# Patient Record
Sex: MALE | Race: WHITE | Employment: OTHER | URBAN - METROPOLITAN AREA
[De-identification: names, ages, dates, MRNs, and addresses within clinical notes are randomized per-mention and may not be internally consistent; named-entity substitution may affect disease eponyms.]

---

## 2018-03-21 ENCOUNTER — HOSPITAL ENCOUNTER (EMERGENCY)
Age: 48
Discharge: HOME | End: 2018-03-21
Payer: COMMERCIAL

## 2018-03-21 VITALS
HEIGHT: 64 IN | WEIGHT: 165 LBS | BODY MASS INDEX: 28.17 KG/M2 | DIASTOLIC BLOOD PRESSURE: 80 MMHG | OXYGEN SATURATION: 98 % | SYSTOLIC BLOOD PRESSURE: 131 MMHG | RESPIRATION RATE: 16 BRPM | HEART RATE: 72 BPM
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
## 2018-03-21 DIAGNOSIS — S39.012A STRAIN OF LUMBAR REGION, INITIAL ENCOUNTER: ICD-10-CM

## 2018-03-21 PROCEDURE — 99283 EMERGENCY DEPT VISIT LOW MDM: CPT

## 2018-03-21 RX ORDER — TRAMADOL HYDROCHLORIDE 50 MG/1
50 TABLET ORAL EVERY 6 HOURS PRN
Qty: 20 TABLET | Refills: 0 | Status: SHIPPED | OUTPATIENT
Start: 2018-03-21 | End: 2018-03-26

## 2018-03-21 RX ORDER — TIZANIDINE 4 MG/1
4 TABLET ORAL EVERY 6 HOURS PRN
Qty: 20 TABLET | Refills: 0 | Status: SHIPPED | OUTPATIENT
Start: 2018-03-21 | End: 2018-03-26

## 2018-03-21 ASSESSMENT — PAIN DESCRIPTION - LOCATION: LOCATION: LEG;BACK

## 2018-03-21 ASSESSMENT — PAIN DESCRIPTION - PAIN TYPE: TYPE: ACUTE PAIN

## 2018-03-21 ASSESSMENT — PAIN DESCRIPTION - ORIENTATION: ORIENTATION: RIGHT

## 2018-03-21 ASSESSMENT — PAIN SCALES - GENERAL: PAINLEVEL_OUTOF10: 7

## 2018-04-11 ENCOUNTER — APPOINTMENT (OUTPATIENT)
Dept: GENERAL RADIOLOGY | Age: 48
End: 2018-04-11
Payer: COMMERCIAL

## 2018-04-11 ENCOUNTER — APPOINTMENT (OUTPATIENT)
Dept: CT IMAGING | Age: 48
End: 2018-04-11
Payer: COMMERCIAL

## 2018-04-11 ENCOUNTER — HOSPITAL ENCOUNTER (EMERGENCY)
Age: 48
Discharge: HOME | End: 2018-04-11
Attending: EMERGENCY MEDICINE
Payer: COMMERCIAL

## 2018-04-11 VITALS
OXYGEN SATURATION: 99 % | RESPIRATION RATE: 20 BRPM | BODY MASS INDEX: 28.17 KG/M2 | DIASTOLIC BLOOD PRESSURE: 73 MMHG | HEART RATE: 81 BPM | WEIGHT: 165 LBS | TEMPERATURE: 98.3 F | SYSTOLIC BLOOD PRESSURE: 109 MMHG | HEIGHT: 64 IN

## 2018-04-11 DIAGNOSIS — N20.1 CALCULUS OF URETER: Primary | ICD-10-CM

## 2018-04-11 LAB
ALBUMIN SERPL-MCNC: 4.3 G/DL (ref 3.5–5.2)
ALP BLD-CCNC: 67 U/L (ref 40–129)
ALT SERPL-CCNC: 28 U/L (ref 0–40)
ANION GAP SERPL CALCULATED.3IONS-SCNC: 14 MMOL/L (ref 7–16)
AST SERPL-CCNC: 32 U/L (ref 0–39)
BACTERIA: NORMAL /HPF
BASOPHILS ABSOLUTE: 0 E9/L (ref 0–0.2)
BASOPHILS RELATIVE PERCENT: 0 % (ref 0–2)
BILIRUB SERPL-MCNC: 0.5 MG/DL (ref 0–1.2)
BILIRUBIN URINE: NEGATIVE
BLOOD, URINE: NEGATIVE
BUN BLDV-MCNC: 17 MG/DL (ref 6–20)
CALCIUM SERPL-MCNC: 9.4 MG/DL (ref 8.6–10.2)
CHLORIDE BLD-SCNC: 101 MMOL/L (ref 98–107)
CLARITY: CLEAR
CO2: 26 MMOL/L (ref 22–29)
COLOR: YELLOW
CREAT SERPL-MCNC: 1.6 MG/DL (ref 0.7–1.2)
CRYSTALS, UA: NORMAL
EOSINOPHILS ABSOLUTE: 0 E9/L (ref 0.05–0.5)
EOSINOPHILS RELATIVE PERCENT: 0 % (ref 0–6)
GFR AFRICAN AMERICAN: 56
GFR NON-AFRICAN AMERICAN: 46 ML/MIN/1.73
GLUCOSE BLD-MCNC: 142 MG/DL (ref 74–109)
GLUCOSE URINE: NEGATIVE MG/DL
HCT VFR BLD CALC: 41.9 % (ref 37–54)
HEMOGLOBIN: 14.1 G/DL (ref 12.5–16.5)
KETONES, URINE: ABNORMAL MG/DL
LACTIC ACID: 1.8 MMOL/L (ref 0.5–2.2)
LEUKOCYTE ESTERASE, URINE: NEGATIVE
LIPASE: 47 U/L (ref 13–60)
LYMPHOCYTES ABSOLUTE: 0.52 E9/L (ref 1.5–4)
LYMPHOCYTES RELATIVE PERCENT: 5.2 % (ref 20–42)
MCH RBC QN AUTO: 29.1 PG (ref 26–35)
MCHC RBC AUTO-ENTMCNC: 33.7 % (ref 32–34.5)
MCV RBC AUTO: 86.4 FL (ref 80–99.9)
MONOCYTES ABSOLUTE: 0.52 E9/L (ref 0.1–0.95)
MONOCYTES RELATIVE PERCENT: 5.2 % (ref 2–12)
NEUTROPHILS ABSOLUTE: 9.36 E9/L (ref 1.8–7.3)
NEUTROPHILS RELATIVE PERCENT: 89.6 % (ref 43–80)
NITRITE, URINE: NEGATIVE
NUCLEATED RED BLOOD CELLS: 0 /100 WBC
PDW BLD-RTO: 12.3 FL (ref 11.5–15)
PH UA: 5 (ref 5–9)
PLATELET # BLD: 268 E9/L (ref 130–450)
PMV BLD AUTO: 10.5 FL (ref 7–12)
POTASSIUM SERPL-SCNC: 4.3 MMOL/L (ref 3.5–5)
PROTEIN UA: ABNORMAL MG/DL
RBC # BLD: 4.85 E12/L (ref 3.8–5.8)
RBC UA: NORMAL /HPF (ref 0–2)
SODIUM BLD-SCNC: 141 MMOL/L (ref 132–146)
SPECIFIC GRAVITY UA: >=1.03 (ref 1–1.03)
TOTAL PROTEIN: 7.4 G/DL (ref 6.4–8.3)
UROBILINOGEN, URINE: 0.2 E.U./DL
WBC # BLD: 10.4 E9/L (ref 4.5–11.5)
WBC UA: NORMAL /HPF (ref 0–5)

## 2018-04-11 PROCEDURE — 2580000003 HC RX 258: Performed by: EMERGENCY MEDICINE

## 2018-04-11 PROCEDURE — 6360000002 HC RX W HCPCS: Performed by: EMERGENCY MEDICINE

## 2018-04-11 PROCEDURE — 81001 URINALYSIS AUTO W/SCOPE: CPT

## 2018-04-11 PROCEDURE — 74176 CT ABD & PELVIS W/O CONTRAST: CPT

## 2018-04-11 PROCEDURE — 71045 X-RAY EXAM CHEST 1 VIEW: CPT

## 2018-04-11 PROCEDURE — 99284 EMERGENCY DEPT VISIT MOD MDM: CPT

## 2018-04-11 PROCEDURE — 83605 ASSAY OF LACTIC ACID: CPT

## 2018-04-11 PROCEDURE — 83690 ASSAY OF LIPASE: CPT

## 2018-04-11 PROCEDURE — 96374 THER/PROPH/DIAG INJ IV PUSH: CPT

## 2018-04-11 PROCEDURE — 85025 COMPLETE CBC W/AUTO DIFF WBC: CPT

## 2018-04-11 PROCEDURE — 96375 TX/PRO/DX INJ NEW DRUG ADDON: CPT

## 2018-04-11 PROCEDURE — 80053 COMPREHEN METABOLIC PANEL: CPT

## 2018-04-11 PROCEDURE — 87088 URINE BACTERIA CULTURE: CPT

## 2018-04-11 RX ORDER — KETOROLAC TROMETHAMINE 30 MG/ML
15 INJECTION, SOLUTION INTRAMUSCULAR; INTRAVENOUS ONCE
Status: COMPLETED | OUTPATIENT
Start: 2018-04-11 | End: 2018-04-11

## 2018-04-11 RX ORDER — ONDANSETRON 2 MG/ML
4 INJECTION INTRAMUSCULAR; INTRAVENOUS ONCE
Status: COMPLETED | OUTPATIENT
Start: 2018-04-11 | End: 2018-04-11

## 2018-04-11 RX ORDER — SODIUM CHLORIDE 0.9 % (FLUSH) 0.9 %
10 SYRINGE (ML) INJECTION PRN
Status: DISCONTINUED | OUTPATIENT
Start: 2018-04-11 | End: 2018-04-11 | Stop reason: HOSPADM

## 2018-04-11 RX ORDER — KETOROLAC TROMETHAMINE 10 MG/1
10 TABLET, FILM COATED ORAL EVERY 6 HOURS PRN
Qty: 8 TABLET | Refills: 0 | Status: SHIPPED | OUTPATIENT
Start: 2018-04-11 | End: 2021-09-27

## 2018-04-11 RX ORDER — 0.9 % SODIUM CHLORIDE 0.9 %
1000 INTRAVENOUS SOLUTION INTRAVENOUS ONCE
Status: COMPLETED | OUTPATIENT
Start: 2018-04-11 | End: 2018-04-11

## 2018-04-11 RX ORDER — MORPHINE SULFATE 10 MG/ML
6 INJECTION, SOLUTION INTRAMUSCULAR; INTRAVENOUS ONCE
Status: COMPLETED | OUTPATIENT
Start: 2018-04-11 | End: 2018-04-11

## 2018-04-11 RX ADMIN — SODIUM CHLORIDE 1000 ML: 9 INJECTION, SOLUTION INTRAVENOUS at 09:54

## 2018-04-11 RX ADMIN — KETOROLAC TROMETHAMINE 15 MG: 30 INJECTION, SOLUTION INTRAMUSCULAR at 12:21

## 2018-04-11 RX ADMIN — MORPHINE SULFATE 6 MG: 10 INJECTION, SOLUTION INTRAMUSCULAR; INTRAVENOUS at 09:55

## 2018-04-11 RX ADMIN — ONDANSETRON 4 MG: 2 INJECTION INTRAMUSCULAR; INTRAVENOUS at 09:55

## 2018-04-11 ASSESSMENT — ENCOUNTER SYMPTOMS
DIARRHEA: 0
EYE DISCHARGE: 0
VOMITING: 0
WHEEZING: 0
EYE REDNESS: 0
BACK PAIN: 0
EYE PAIN: 0
SINUS PRESSURE: 0
ABDOMINAL PAIN: 1
ABDOMINAL DISTENTION: 1
SORE THROAT: 0
COUGH: 0
NAUSEA: 0
SHORTNESS OF BREATH: 0

## 2018-04-11 ASSESSMENT — PAIN DESCRIPTION - FREQUENCY: FREQUENCY: CONTINUOUS

## 2018-04-11 ASSESSMENT — PAIN SCALES - GENERAL
PAINLEVEL_OUTOF10: 10
PAINLEVEL_OUTOF10: 5
PAINLEVEL_OUTOF10: 7

## 2018-04-11 ASSESSMENT — PAIN DESCRIPTION - DESCRIPTORS: DESCRIPTORS: ACHING;SHARP

## 2018-04-11 ASSESSMENT — PAIN DESCRIPTION - PAIN TYPE: TYPE: ACUTE PAIN

## 2018-04-11 ASSESSMENT — PAIN DESCRIPTION - LOCATION: LOCATION: ABDOMEN

## 2018-04-11 ASSESSMENT — PAIN DESCRIPTION - ORIENTATION: ORIENTATION: LEFT

## 2018-04-13 LAB — URINE CULTURE, ROUTINE: NORMAL

## 2019-07-16 ENCOUNTER — APPOINTMENT (OUTPATIENT)
Dept: CT IMAGING | Age: 49
End: 2019-07-16
Payer: OTHER MISCELLANEOUS

## 2019-07-16 ENCOUNTER — HOSPITAL ENCOUNTER (EMERGENCY)
Age: 49
Discharge: HOME | End: 2019-07-16
Payer: OTHER MISCELLANEOUS

## 2019-07-16 VITALS
TEMPERATURE: 98.3 F | HEIGHT: 64 IN | RESPIRATION RATE: 14 BRPM | BODY MASS INDEX: 28.17 KG/M2 | HEART RATE: 78 BPM | OXYGEN SATURATION: 97 % | SYSTOLIC BLOOD PRESSURE: 129 MMHG | WEIGHT: 165 LBS | DIASTOLIC BLOOD PRESSURE: 72 MMHG

## 2019-07-16 DIAGNOSIS — S01.512A LACERATION OF ORAL CAVITY, INITIAL ENCOUNTER: ICD-10-CM
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
## 2019-07-16 DIAGNOSIS — S09.90XA INJURY OF HEAD, INITIAL ENCOUNTER: ICD-10-CM

## 2019-07-16 PROCEDURE — 70450 CT HEAD/BRAIN W/O DYE: CPT

## 2019-07-16 PROCEDURE — 6370000000 HC RX 637 (ALT 250 FOR IP): Performed by: NURSE PRACTITIONER

## 2019-07-16 PROCEDURE — 99283 EMERGENCY DEPT VISIT LOW MDM: CPT

## 2019-07-16 RX ORDER — IBUPROFEN 800 MG/1
800 TABLET ORAL ONCE
Status: COMPLETED | OUTPATIENT
Start: 2019-07-16 | End: 2019-07-16

## 2019-07-16 RX ORDER — IBUPROFEN 800 MG/1
800 TABLET ORAL EVERY 6 HOURS PRN
Qty: 21 TABLET | Refills: 0 | Status: SHIPPED | OUTPATIENT
Start: 2019-07-16 | End: 2019-10-10 | Stop reason: ALTCHOICE

## 2019-07-16 RX ADMIN — IBUPROFEN 800 MG: 800 TABLET, FILM COATED ORAL at 19:27

## 2019-07-16 ASSESSMENT — PAIN DESCRIPTION - DESCRIPTORS: DESCRIPTORS: HEADACHE

## 2019-07-16 ASSESSMENT — PAIN DESCRIPTION - ONSET: ONSET: SUDDEN

## 2019-07-16 ASSESSMENT — PAIN DESCRIPTION - LOCATION: LOCATION: HEAD

## 2019-07-16 ASSESSMENT — PAIN DESCRIPTION - FREQUENCY: FREQUENCY: CONTINUOUS

## 2019-07-16 ASSESSMENT — PAIN DESCRIPTION - PAIN TYPE: TYPE: ACUTE PAIN

## 2019-07-16 ASSESSMENT — PAIN DESCRIPTION - PROGRESSION: CLINICAL_PROGRESSION: GRADUALLY WORSENING

## 2019-07-16 ASSESSMENT — PAIN DESCRIPTION - ORIENTATION: ORIENTATION: LEFT

## 2019-07-16 ASSESSMENT — PAIN SCALES - GENERAL: PAINLEVEL_OUTOF10: 7

## 2019-07-16 NOTE — ED NOTES
Reviewed discharge instructions with patient, discussed medications and addressed all patient questions/concerns. Pt verbalizes understanding.        Jhonatan Norris RN  07/16/19 2118
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
## 2019-10-10 ENCOUNTER — APPOINTMENT (OUTPATIENT)
Dept: ULTRASOUND IMAGING | Age: 49
End: 2019-10-10
Payer: COMMERCIAL

## 2019-10-10 ENCOUNTER — HOSPITAL ENCOUNTER (EMERGENCY)
Age: 49
Discharge: HOME | End: 2019-10-10
Payer: COMMERCIAL

## 2019-10-10 VITALS
HEART RATE: 75 BPM | SYSTOLIC BLOOD PRESSURE: 126 MMHG | RESPIRATION RATE: 16 BRPM | HEIGHT: 64 IN | BODY MASS INDEX: 28.17 KG/M2 | OXYGEN SATURATION: 97 % | DIASTOLIC BLOOD PRESSURE: 66 MMHG | WEIGHT: 165 LBS | TEMPERATURE: 98.2 F

## 2019-10-10 DIAGNOSIS — M71.22 BAKER'S CYST OF KNEE, LEFT: Primary | ICD-10-CM

## 2019-10-10 PROCEDURE — 93971 EXTREMITY STUDY: CPT

## 2019-10-10 PROCEDURE — 99283 EMERGENCY DEPT VISIT LOW MDM: CPT

## 2019-10-10 RX ORDER — NAPROXEN 500 MG/1
500 TABLET ORAL 2 TIMES DAILY PRN
Qty: 28 TABLET | Refills: 0 | Status: SHIPPED | OUTPATIENT
Start: 2019-10-10 | End: 2021-09-27

## 2019-10-10 ASSESSMENT — PAIN DESCRIPTION - ORIENTATION: ORIENTATION: LEFT

## 2019-10-10 ASSESSMENT — PAIN SCALES - GENERAL: PAINLEVEL_OUTOF10: 10

## 2019-10-10 ASSESSMENT — PAIN DESCRIPTION - PAIN TYPE: TYPE: ACUTE PAIN

## 2019-10-10 ASSESSMENT — PAIN DESCRIPTION - LOCATION: LOCATION: KNEE

## 2021-09-27 ENCOUNTER — HOSPITAL ENCOUNTER (INPATIENT)
Age: 51
LOS: 7 days | Discharge: HOME | DRG: 177 | End: 2021-10-04
Attending: EMERGENCY MEDICINE | Admitting: INTERNAL MEDICINE
Payer: COMMERCIAL

## 2021-09-27 ENCOUNTER — APPOINTMENT (OUTPATIENT)
Dept: GENERAL RADIOLOGY | Age: 51
DRG: 177 | End: 2021-09-27
Payer: COMMERCIAL

## 2021-09-27 DIAGNOSIS — J96.00 ACUTE RESPIRATORY FAILURE DUE TO COVID-19 (HCC): ICD-10-CM

## 2021-09-27 DIAGNOSIS — U07.1 ACUTE RESPIRATORY FAILURE DUE TO COVID-19 (HCC): ICD-10-CM

## 2021-09-27 DIAGNOSIS — U07.1 COVID-19: Primary | ICD-10-CM

## 2021-09-27 PROBLEM — J96.01 ACUTE RESPIRATORY FAILURE WITH HYPOXIA (HCC): Status: ACTIVE | Noted: 2021-09-27

## 2021-09-27 LAB
ALBUMIN SERPL-MCNC: 3.4 G/DL (ref 3.5–5.2)
ALP BLD-CCNC: 129 U/L (ref 40–129)
ALT SERPL-CCNC: 164 U/L (ref 0–40)
ANION GAP SERPL CALCULATED.3IONS-SCNC: 13 MMOL/L (ref 7–16)
AST SERPL-CCNC: 51 U/L (ref 0–39)
B.E.: 2.3 MMOL/L (ref -3–3)
BASOPHILS ABSOLUTE: 0.04 E9/L (ref 0–0.2)
BASOPHILS RELATIVE PERCENT: 0.3 % (ref 0–2)
BILIRUB SERPL-MCNC: 0.3 MG/DL (ref 0–1.2)
BUN BLDV-MCNC: 20 MG/DL (ref 6–20)
C-REACTIVE PROTEIN: 17.7 MG/DL (ref 0–0.4)
CALCIUM SERPL-MCNC: 9.3 MG/DL (ref 8.6–10.2)
CHLORIDE BLD-SCNC: 101 MMOL/L (ref 98–107)
CO2: 26 MMOL/L (ref 22–29)
COHB: 0.3 % (ref 0–1.5)
CREAT SERPL-MCNC: 0.9 MG/DL (ref 0.7–1.2)
CRITICAL: ABNORMAL
D DIMER: 1940 NG/ML DDU
DATE ANALYZED: ABNORMAL
DATE OF COLLECTION: ABNORMAL
EKG ATRIAL RATE: 94 BPM
EKG P AXIS: 21 DEGREES
EKG P-R INTERVAL: 132 MS
EKG Q-T INTERVAL: 338 MS
EKG QRS DURATION: 74 MS
EKG QTC CALCULATION (BAZETT): 422 MS
EKG R AXIS: 15 DEGREES
EKG T AXIS: 20 DEGREES
EKG VENTRICULAR RATE: 94 BPM
EOSINOPHILS ABSOLUTE: 0.07 E9/L (ref 0.05–0.5)
EOSINOPHILS RELATIVE PERCENT: 0.6 % (ref 0–6)
FERRITIN: 2693 NG/ML
GFR AFRICAN AMERICAN: >60
GFR NON-AFRICAN AMERICAN: >60 ML/MIN/1.73
GLUCOSE BLD-MCNC: 137 MG/DL (ref 74–99)
HBA1C MFR BLD: 6 % (ref 4–5.6)
HCO3: 25.5 MMOL/L (ref 22–26)
HCT VFR BLD CALC: 41.3 % (ref 37–54)
HEMOGLOBIN: 13.3 G/DL (ref 12.5–16.5)
HHB: 4.5 % (ref 0–5)
IMMATURE GRANULOCYTES #: 0.22 E9/L
IMMATURE GRANULOCYTES %: 1.9 % (ref 0–5)
LAB: ABNORMAL
LYMPHOCYTES ABSOLUTE: 0.75 E9/L (ref 1.5–4)
LYMPHOCYTES RELATIVE PERCENT: 6.5 % (ref 20–42)
Lab: ABNORMAL
MCH RBC QN AUTO: 29.3 PG (ref 26–35)
MCHC RBC AUTO-ENTMCNC: 32.2 % (ref 32–34.5)
MCV RBC AUTO: 91 FL (ref 80–99.9)
METHB: 0.3 % (ref 0–1.5)
MODE: ABNORMAL
MONOCYTES ABSOLUTE: 1.2 E9/L (ref 0.1–0.95)
MONOCYTES RELATIVE PERCENT: 10.4 % (ref 2–12)
NEUTROPHILS ABSOLUTE: 9.23 E9/L (ref 1.8–7.3)
NEUTROPHILS RELATIVE PERCENT: 80.3 % (ref 43–80)
O2 CONTENT: 17.3 ML/DL
O2 SATURATION: 95.5 % (ref 92–98.5)
O2HB: 94.9 % (ref 94–97)
OPERATOR ID: 8213
PATIENT TEMP: 37 C
PCO2: 34.8 MMHG (ref 35–45)
PDW BLD-RTO: 13 FL (ref 11.5–15)
PH BLOOD GAS: 7.48 (ref 7.35–7.45)
PLATELET # BLD: 536 E9/L (ref 130–450)
PMV BLD AUTO: 10.3 FL (ref 7–12)
PO2: 74.7 MMHG (ref 75–100)
POTASSIUM REFLEX MAGNESIUM: 3.8 MMOL/L (ref 3.5–5)
PROCALCITONIN: 0.18 NG/ML (ref 0–0.08)
RBC # BLD: 4.54 E12/L (ref 3.8–5.8)
SODIUM BLD-SCNC: 140 MMOL/L (ref 132–146)
SOURCE, BLOOD GAS: ABNORMAL
THB: 12.9 G/DL (ref 11.5–16.5)
TIME ANALYZED: 1543
TOTAL PROTEIN: 7.7 G/DL (ref 6.4–8.3)
WBC # BLD: 11.5 E9/L (ref 4.5–11.5)

## 2021-09-27 PROCEDURE — 99285 EMERGENCY DEPT VISIT HI MDM: CPT

## 2021-09-27 PROCEDURE — 2060000000 HC ICU INTERMEDIATE R&B

## 2021-09-27 PROCEDURE — 93005 ELECTROCARDIOGRAM TRACING: CPT | Performed by: FAMILY MEDICINE

## 2021-09-27 PROCEDURE — 6370000000 HC RX 637 (ALT 250 FOR IP): Performed by: NURSE PRACTITIONER

## 2021-09-27 PROCEDURE — 99223 1ST HOSP IP/OBS HIGH 75: CPT | Performed by: INTERNAL MEDICINE

## 2021-09-27 PROCEDURE — 2580000003 HC RX 258: Performed by: FAMILY MEDICINE

## 2021-09-27 PROCEDURE — 86140 C-REACTIVE PROTEIN: CPT

## 2021-09-27 PROCEDURE — 82805 BLOOD GASES W/O2 SATURATION: CPT

## 2021-09-27 PROCEDURE — 71045 X-RAY EXAM CHEST 1 VIEW: CPT

## 2021-09-27 PROCEDURE — 6360000002 HC RX W HCPCS: Performed by: NURSE PRACTITIONER

## 2021-09-27 PROCEDURE — XW0DXM6 INTRODUCTION OF BARICITINIB INTO MOUTH AND PHARYNX, EXTERNAL APPROACH, NEW TECHNOLOGY GROUP 6: ICD-10-PCS | Performed by: INTERNAL MEDICINE

## 2021-09-27 PROCEDURE — 82728 ASSAY OF FERRITIN: CPT

## 2021-09-27 PROCEDURE — 87040 BLOOD CULTURE FOR BACTERIA: CPT

## 2021-09-27 PROCEDURE — 80053 COMPREHEN METABOLIC PANEL: CPT

## 2021-09-27 PROCEDURE — 2700000000 HC OXYGEN THERAPY PER DAY

## 2021-09-27 PROCEDURE — 84145 PROCALCITONIN (PCT): CPT

## 2021-09-27 PROCEDURE — 85025 COMPLETE CBC W/AUTO DIFF WBC: CPT

## 2021-09-27 PROCEDURE — 85378 FIBRIN DEGRADE SEMIQUANT: CPT

## 2021-09-27 PROCEDURE — 83036 HEMOGLOBIN GLYCOSYLATED A1C: CPT

## 2021-09-27 PROCEDURE — 2580000003 HC RX 258: Performed by: NURSE PRACTITIONER

## 2021-09-27 PROCEDURE — 93010 ELECTROCARDIOGRAM REPORT: CPT | Performed by: INTERNAL MEDICINE

## 2021-09-27 RX ORDER — DEXAMETHASONE SODIUM PHOSPHATE 4 MG/ML
6 INJECTION, SOLUTION INTRA-ARTICULAR; INTRALESIONAL; INTRAMUSCULAR; INTRAVENOUS; SOFT TISSUE EVERY 24 HOURS
Status: DISCONTINUED | OUTPATIENT
Start: 2021-09-27 | End: 2021-09-28

## 2021-09-27 RX ORDER — DEXAMETHASONE SODIUM PHOSPHATE 10 MG/ML
6 INJECTION INTRAMUSCULAR; INTRAVENOUS EVERY 24 HOURS
Status: DISCONTINUED | OUTPATIENT
Start: 2021-09-27 | End: 2021-09-27 | Stop reason: SDUPTHER

## 2021-09-27 RX ORDER — POLYETHYLENE GLYCOL 3350 17 G/17G
17 POWDER, FOR SOLUTION ORAL DAILY PRN
Status: DISCONTINUED | OUTPATIENT
Start: 2021-09-27 | End: 2021-10-05 | Stop reason: HOSPADM

## 2021-09-27 RX ORDER — SODIUM CHLORIDE 0.9 % (FLUSH) 0.9 %
5-40 SYRINGE (ML) INJECTION EVERY 12 HOURS SCHEDULED
Status: DISCONTINUED | OUTPATIENT
Start: 2021-09-27 | End: 2021-10-05 | Stop reason: HOSPADM

## 2021-09-27 RX ORDER — ONDANSETRON 4 MG/1
4 TABLET, ORALLY DISINTEGRATING ORAL EVERY 8 HOURS PRN
Status: DISCONTINUED | OUTPATIENT
Start: 2021-09-27 | End: 2021-10-05 | Stop reason: HOSPADM

## 2021-09-27 RX ORDER — ONDANSETRON 2 MG/ML
4 INJECTION INTRAMUSCULAR; INTRAVENOUS EVERY 6 HOURS PRN
Status: DISCONTINUED | OUTPATIENT
Start: 2021-09-27 | End: 2021-10-05 | Stop reason: HOSPADM

## 2021-09-27 RX ORDER — 0.9 % SODIUM CHLORIDE 0.9 %
1000 INTRAVENOUS SOLUTION INTRAVENOUS ONCE
Status: COMPLETED | OUTPATIENT
Start: 2021-09-27 | End: 2021-09-27

## 2021-09-27 RX ORDER — ZINC SULFATE 50(220)MG
50 CAPSULE ORAL DAILY
Status: DISCONTINUED | OUTPATIENT
Start: 2021-09-27 | End: 2021-10-05 | Stop reason: HOSPADM

## 2021-09-27 RX ORDER — SODIUM CHLORIDE 0.9 % (FLUSH) 0.9 %
5-40 SYRINGE (ML) INJECTION PRN
Status: DISCONTINUED | OUTPATIENT
Start: 2021-09-27 | End: 2021-10-05 | Stop reason: HOSPADM

## 2021-09-27 RX ORDER — SODIUM CHLORIDE 9 MG/ML
25 INJECTION, SOLUTION INTRAVENOUS PRN
Status: DISCONTINUED | OUTPATIENT
Start: 2021-09-27 | End: 2021-10-05 | Stop reason: HOSPADM

## 2021-09-27 RX ORDER — VITAMIN B COMPLEX
2000 TABLET ORAL DAILY
Status: DISCONTINUED | OUTPATIENT
Start: 2021-09-27 | End: 2021-10-05 | Stop reason: HOSPADM

## 2021-09-27 RX ORDER — ACETAMINOPHEN 325 MG/1
650 TABLET ORAL EVERY 6 HOURS PRN
Status: DISCONTINUED | OUTPATIENT
Start: 2021-09-27 | End: 2021-10-05 | Stop reason: HOSPADM

## 2021-09-27 RX ORDER — ASCORBIC ACID 500 MG
500 TABLET ORAL 2 TIMES DAILY
Status: DISCONTINUED | OUTPATIENT
Start: 2021-09-27 | End: 2021-10-05 | Stop reason: HOSPADM

## 2021-09-27 RX ORDER — ACETAMINOPHEN 650 MG/1
650 SUPPOSITORY RECTAL EVERY 6 HOURS PRN
Status: DISCONTINUED | OUTPATIENT
Start: 2021-09-27 | End: 2021-10-05 | Stop reason: HOSPADM

## 2021-09-27 RX ADMIN — ZINC SULFATE 220 MG (50 MG) CAPSULE 50 MG: CAPSULE at 16:28

## 2021-09-27 RX ADMIN — ENOXAPARIN SODIUM 30 MG: 30 INJECTION SUBCUTANEOUS at 19:46

## 2021-09-27 RX ADMIN — DEXAMETHASONE SODIUM PHOSPHATE 6 MG: 4 INJECTION, SOLUTION INTRAMUSCULAR; INTRAVENOUS at 16:38

## 2021-09-27 RX ADMIN — Medication 500 MG: at 19:46

## 2021-09-27 RX ADMIN — Medication 2000 UNITS: at 16:37

## 2021-09-27 RX ADMIN — BARICITINIB 4 MG: 2 TABLET, FILM COATED ORAL at 17:12

## 2021-09-27 RX ADMIN — Medication 10 ML: at 19:46

## 2021-09-27 RX ADMIN — SODIUM CHLORIDE 1000 ML: 9 INJECTION, SOLUTION INTRAVENOUS at 11:51

## 2021-09-27 ASSESSMENT — ENCOUNTER SYMPTOMS
VOMITING: 0
COUGH: 1
SORE THROAT: 0
DIARRHEA: 0
ABDOMINAL PAIN: 0
NAUSEA: 0
TROUBLE SWALLOWING: 0
CONSTIPATION: 0

## 2021-09-27 ASSESSMENT — PAIN SCALES - GENERAL: PAINLEVEL_OUTOF10: 0

## 2021-09-27 NOTE — ACP (ADVANCE CARE PLANNING)
Provider review and signature  [] POLST/POST/MOLST/MOST prepared for Provider review and signature      Follow-up plan:    [] Schedule follow-up conversation to continue planning  [x] Referred individual to Provider for additional questions/concerns   [] Advised patient/agent/surrogate to review completed ACP document and update if needed with changes in condition, patient preferences or care setting    [] This note routed to one or more involved healthcare providers

## 2021-09-27 NOTE — ED NOTES
Spoke to felix at ext [phone], confirmed sbar pt prepared for transport via cart and monitor and 15L NRB     Ronna Valencia, FERNANDO  09/27/21 2359

## 2021-09-27 NOTE — ED PROVIDER NOTES
rhinorrhea. Eyes:      General: No scleral icterus. Conjunctiva/sclera: Conjunctivae normal.   Cardiovascular:      Rate and Rhythm: Normal rate and regular rhythm. Pulses: Normal pulses. Heart sounds: Normal heart sounds. No murmur heard. Pulmonary:      Breath sounds: Normal breath sounds. No wheezing, rhonchi or rales. Abdominal:      General: Abdomen is flat. Bowel sounds are normal.   Musculoskeletal:      Right lower leg: No edema. Left lower leg: No edema. Skin:     General: Skin is warm. Findings: No rash. Neurological:      General: No focal deficit present. Mental Status: He is alert. Psychiatric:         Mood and Affect: Mood normal.         Behavior: Behavior normal.     45year old male was Covid +10 days ago and recently treated with Medrol and Z-Alberto who presented to ER after seen at urgent care this morning and was found to have low O2 sats around 81. Patient was also treated with Z-Alberto and Medrol Dosepak as an outpatient. She denies any chest pain, shortness of breath, difficulty breathing, fever, chills at this time. His O2 sats in the ER are around 88. He is unvaccinated. He was satting low in the ED around  88 hence he was put on 6 L nasal cannula and with increased work of breathing he was switched over to nonrebreather mask. We spoke to Dr. Gee will admit the patient to medicine service. Dr. Gee would like pulmonology consulted for this patient.         --------------------------------------------- PAST HISTORY ---------------------------------------------  Past Medical History:  has a past medical history of Kidney stone. Past Surgical History:  has no past surgical history on file. Social History:  reports that he has never smoked. He has never used smokeless tobacco. He reports that he does not drink alcohol and does not use drugs. Family History: family history is not on file.      The patients home medications have been reviewed. Allergies: Patient has no known allergies.     -------------------------------------------------- RESULTS -------------------------------------------------    LABS:  Results for orders placed or performed during the hospital encounter of 09/27/21   C-reactive protein   Result Value Ref Range    CRP 17.7 (H) 0.0 - 0.4 mg/dL   FERRITIN   Result Value Ref Range    Ferritin 2,693 ng/mL   CBC WITH AUTO DIFFERENTIAL   Result Value Ref Range    WBC 11.5 4.5 - 11.5 E9/L    RBC 4.54 3.80 - 5.80 E12/L    Hemoglobin 13.3 12.5 - 16.5 g/dL    Hematocrit 41.3 37.0 - 54.0 %    MCV 91.0 80.0 - 99.9 fL    MCH 29.3 26.0 - 35.0 pg    MCHC 32.2 32.0 - 34.5 %    RDW 13.0 11.5 - 15.0 fL    Platelets 896 (H) 535 - 450 E9/L    MPV 10.3 7.0 - 12.0 fL    Neutrophils % 80.3 (H) 43.0 - 80.0 %    Immature Granulocytes % 1.9 0.0 - 5.0 %    Lymphocytes % 6.5 (L) 20.0 - 42.0 %    Monocytes % 10.4 2.0 - 12.0 %    Eosinophils % 0.6 0.0 - 6.0 %    Basophils % 0.3 0.0 - 2.0 %    Neutrophils Absolute 9.23 (H) 1.80 - 7.30 E9/L    Immature Granulocytes # 0.22 E9/L    Lymphocytes Absolute 0.75 (L) 1.50 - 4.00 E9/L    Monocytes Absolute 1.20 (H) 0.10 - 0.95 E9/L    Eosinophils Absolute 0.07 0.05 - 0.50 E9/L    Basophils Absolute 0.04 0.00 - 0.20 E9/L   Comprehensive Metabolic Panel w/ Reflex to MG   Result Value Ref Range    Sodium 140 132 - 146 mmol/L    Potassium reflex Magnesium 3.8 3.5 - 5.0 mmol/L    Chloride 101 98 - 107 mmol/L    CO2 26 22 - 29 mmol/L    Anion Gap 13 7 - 16 mmol/L    Glucose 137 (H) 74 - 99 mg/dL    BUN 20 6 - 20 mg/dL    CREATININE 0.9 0.7 - 1.2 mg/dL    GFR Non-African American >60 >=60 mL/min/1.73    GFR African American >60     Calcium 9.3 8.6 - 10.2 mg/dL    Total Protein 7.7 6.4 - 8.3 g/dL    Albumin 3.4 (L) 3.5 - 5.2 g/dL    Total Bilirubin 0.3 0.0 - 1.2 mg/dL    Alkaline Phosphatase 129 40 - 129 U/L     (H) 0 - 40 U/L    AST 51 (H) 0 - 39 U/L   EKG 12 Lead   Result Value Ref Range    Ventricular Rate 94 BPM    Atrial Rate 94 BPM    P-R Interval 132 ms    QRS Duration 74 ms    Q-T Interval 338 ms    QTc Calculation (Bazett) 422 ms    P Axis 21 degrees    R Axis 15 degrees    T Axis 20 degrees       RADIOLOGY:  XR CHEST PORTABLE   Final Result   1. Multifocal bilateral pneumonia             EKG:  This EKG is signed and interpreted by me. Rate: 94  Rhythm: Sinus  Interpretation: no acute changes  Comparison: was normal and no previous EKG available      ------------------------- NURSING NOTES AND VITALS REVIEWED ---------------------------  Date / Time Roomed:  9/27/2021 10:53 AM  ED Bed Assignment:  23/23    The nursing notes within the ED encounter and vital signs as below have been reviewed. Patient Vitals for the past 24 hrs:   BP Temp Temp src Pulse Resp SpO2 Height Weight   09/27/21 1526 (!) 140/87 -- -- 80 16 96 % -- --   09/27/21 1427 -- -- -- 83 16 95 % -- --   09/27/21 1354 136/85 98 °F (36.7 °C) Oral 90 16 94 % -- --   09/27/21 1326 -- -- -- 81 16 94 % -- --   09/27/21 1235 -- -- -- 71 16 99 % -- --   09/27/21 1200 -- -- -- 87 16 97 % -- --   09/27/21 1116 (!) 140/85 -- -- -- -- -- -- --   09/27/21 1112 -- 98.1 °F (36.7 °C) Oral 99 18 (!) 88 % 5' 4.5\" (1.638 m) 165 lb (74.8 kg)       Oxygen Saturation Interpretation: Normal    ------------------------------------------ PROGRESS NOTES ------------------------------------------  Re-evaluation(s):  Time: 1400  Patients symptoms show no change  Repeat physical examination is not changed    Counseling:  I have spoken with the patient and discussed todays results, in addition to providing specific details for the plan of care and counseling regarding the diagnosis and prognosis. Their questions are answered at this time and they are agreeable with the plan of admission.    --------------------------------- ADDITIONAL PROVIDER NOTES ---------------------------------  Consultations:  Time: 1530. Spoke with Dr. Gee. Discussed case.   They will admit the patient. This patient's ED course included: a personal history and physicial examination, re-evaluation prior to disposition, multiple bedside re-evaluations and complex medical decision making and emergency management    This patient has remained hemodynamically stable during their ED course. Diagnosis:  1. COVID-19    2. Acute respiratory failure due to COVID-19 [facility])        Disposition:  Patient's disposition: Admit to telemetry  Patient's condition is stable.          Bushra Hendrix MD  Resident  09/27/21 2361

## 2021-09-27 NOTE — H&P
[facility] History and Physical      CHIEF COMPLAINT:  + COVID 23, sent in from urgent care for hypoxia     History of Present Illness:     Patient is a 44 yo male patient who follows with PCP Dr. Joe Mendez with past medical history kidney stones. Patient presented to the ER sent in from urgent care with hypoxia-. Known COVID +. Reporting today is day 10 of symptoms so he went to get a COVID test so that he could return to work. He was told that his oxygen was low and that he needed to go to ER for evaluation. He works as a . Reporting he has been feeling ok. At night his symptoms are the worst-- reporting coughing more at night and exertional sob. Productive clear sputum. Sapphire Lanza He completed a zpak and steroid taper. He has been taking excedrin prn. He does report loss of taste/ smell- but returning. Symptoms moderate ongoing and progressive. He did not get his vaccine. He thinks that his children may have had COVID, but they are fine now. Patient awake and alert resting in ER in no acute distress. Reporting to get up to restroom he became somwhat sob. Denies fevers, chills, chest pain, nausea, vomiting, dysuria, hematuria, hematochezia. Informant(s) for H&P:patient, chart review       REVIEW OF SYSTEMS:  A comprehensive review of systems was negative except for: what is in the HPI        PMH:  Past Medical History:   Diagnosis Date    Kidney stone        Surgical History:  History reviewed. No pertinent surgical history. Medications Prior to Admission:    Prior to Admission medications    Medication Sig Start Date End Date Taking? Authorizing Provider   naproxen (NAPROSYN) 500 MG tablet Take 1 tablet by mouth 2 times daily as needed for Pain 10/10/19   Merline Salm, APRN - CNP   ketorolac (TORADOL) 10 MG tablet Take 1 tablet by mouth every 6 hours as needed for Pain 4/11/18 4/13/18  Jose Carlos Victor DO       Allergies:    Patient has no known allergies.     Social History:    reports that he has never smoked. He has never used smokeless tobacco. He reports that he does not drink alcohol and does not use drugs. Denies tobacco, illicits  Occasional etoh    Family History:   family history is not on file. No reportable family history     PHYSICAL EXAM:  Vitals:  BP (!) 140/87   Pulse 80   Temp 98 °F (36.7 °C) (Oral)   Resp 16   Ht 5' 4.5\" (1.638 m)   Wt 165 lb (74.8 kg)   SpO2 96%   BMI 27.88 kg/m²     General Appearance: alert and oriented to person, place and time and in no acute distress  Skin: warm and dry  Head: normocephalic and atraumatic  Neck: neck supple and non tender without mass   Pulmonary/Chest: clear to auscultation bilaterally  Cardiovascular: normal rate, normal S1 and S2 and no carotid bruits  Abdomen: soft, non-tender, non-distended, normal bowel sounds,  Extremities: no cyanosis, no clubbing and no edema  Neurologic: speech normal         LABS:  Recent Labs     09/27/21  1134      K 3.8      CO2 26   BUN 20   CREATININE 0.9   GLUCOSE 137*   CALCIUM 9.3       Recent Labs     09/27/21  1134   WBC 11.5   RBC 4.54   HGB 13.3   HCT 41.3   MCV 91.0   MCH 29.3   MCHC 32.2   RDW 13.0   *   MPV 10.3       No results for input(s): POCGLU in the last 72 hours. Radiology:   XR CHEST PORTABLE   Final Result   1. Multifocal bilateral pneumonia               ASSESSMENT:      Active Problems:    Acute respiratory failure with hypoxia (HCC)  Resolved Problems:    * No resolved hospital problems. *      PLAN:    1. Acute respiratory failure with hypoxia related to COVID 19 in unvaccinated pt: Pt sent in from urgent care for hypoxia. Known COVID positive. Today is day 10 of symptoms and he went to get a repeat test to see if he could return to work. Reporting coughing at night- clear sputum and exertional sob. Reporting sob in ER walking to restroom. He was 88% on RA on arrival to ER. Currently 91% ON 15 l hfnc. Consult pulmonology.     2. COVID 19 Viral Pneumonia: CXR showing multifocal bilateral pneumonia: productive of clear sputum. Will check procal. Recently completed steroid taper/ z-pack. Follow inflammatory\ markers. CRP 17.7 Consult pharmacy for baricitinib. Vitamin supplementation. Incentive spirometer. Prone as tolerated. Encouraged to get oob to chair. Lovenox per protocal. Check d dimer. 3. Elevated glucose: check hga1c. Pt has been on steroids outpt. Insulin ss. 4. Elevated LFTS: monitor         Code Status: FULL  DVT prophylaxis: lovenox      NOTE: This report was transcribed using voice recognition software. Every effort was made to ensure accuracy; however, inadvertent computerized transcription errors may be present. Electronically signed by CODY Zimmerman on 9/27/2021 at 3:36 PM     Addendum: I have personally participated in the history, exam, medical decision making with Kaitlyn Alfaro on the date of service and I agree with all of the pertinent clinical information unless otherwise noted. I have also reviewed and agree with the past medical, family, and social history unless otherwise noted. Patient was admitted with positive covid from urgent care    PHYSICAL EXAM:  Vitals:  /82   Pulse 93   Temp 98 °F (36.7 °C) (Oral)   Resp 16   Ht 5' 4.5\" (1.638 m)   Wt 165 lb (74.8 kg)   SpO2 95%   BMI 27.88 kg/m²   Gen: awake, alert, NAD  Lungs: clear to auscultation bilaterally no crackles no wheezing. Heart: RRR, no murmur   Abdomen: soft nontender nondistended positive bowel sounds. Extremities: full range of motion no peripheral edema. Impression:  Active Problems:    Acute respiratory failure with hypoxia (HCC)  Resolved Problems:    * No resolved hospital problems. *      My findings/plan include:    Patient is on acute hypoxic respiratory failure 2/2 covid 19 pneumonia. He will be treated with decadron, baricitinib, and vitamins. Will trend inflammatory markers.  Will follow up with procalcitonin. Pulmonology will be consulted. NOTE: This report was transcribed using voice recognition software. Every effort was made to ensure accuracy; however, inadvertent computerized transcription errors may be present.     Electronically signed by Raghu Hampton DO on 9/27/2021 at 5:36 PM

## 2021-09-27 NOTE — Clinical Note
Patient Class: Inpatient [101]   REQUIRED: Diagnosis: Acute respiratory failure with hypoxia [facility] [[id number]]   Estimated Length of Stay: Estimated stay of more than 2 midnights

## 2021-09-28 LAB
ADENOVIRUS BY PCR: NOT DETECTED
ALBUMIN SERPL-MCNC: 2.9 G/DL (ref 3.5–5.2)
ALP BLD-CCNC: 119 U/L (ref 40–129)
ALT SERPL-CCNC: 136 U/L (ref 0–40)
ANION GAP SERPL CALCULATED.3IONS-SCNC: 9 MMOL/L (ref 7–16)
AST SERPL-CCNC: 55 U/L (ref 0–39)
BASOPHILS ABSOLUTE: 0.01 E9/L (ref 0–0.2)
BASOPHILS RELATIVE PERCENT: 0.2 % (ref 0–2)
BILIRUB SERPL-MCNC: 0.3 MG/DL (ref 0–1.2)
BORDETELLA PARAPERTUSSIS BY PCR: NOT DETECTED
BORDETELLA PERTUSSIS BY PCR: NOT DETECTED
BUN BLDV-MCNC: 19 MG/DL (ref 6–20)
CALCIUM SERPL-MCNC: 9.2 MG/DL (ref 8.6–10.2)
CHLAMYDOPHILIA PNEUMONIAE BY PCR: NOT DETECTED
CHLORIDE BLD-SCNC: 102 MMOL/L (ref 98–107)
CO2: 25 MMOL/L (ref 22–29)
CORONAVIRUS 229E BY PCR: NOT DETECTED
CORONAVIRUS HKU1 BY PCR: NOT DETECTED
CORONAVIRUS NL63 BY PCR: NOT DETECTED
CORONAVIRUS OC43 BY PCR: NOT DETECTED
CREAT SERPL-MCNC: 0.9 MG/DL (ref 0.7–1.2)
D DIMER: 1797 NG/ML DDU
EOSINOPHILS ABSOLUTE: 0 E9/L (ref 0.05–0.5)
EOSINOPHILS RELATIVE PERCENT: 0 % (ref 0–6)
GFR AFRICAN AMERICAN: >60
GFR NON-AFRICAN AMERICAN: >60 ML/MIN/1.73
GLUCOSE BLD-MCNC: 155 MG/DL (ref 74–99)
HCT VFR BLD CALC: 38.1 % (ref 37–54)
HEMOGLOBIN: 12.5 G/DL (ref 12.5–16.5)
HUMAN METAPNEUMOVIRUS BY PCR: NOT DETECTED
HUMAN RHINOVIRUS/ENTEROVIRUS BY PCR: NOT DETECTED
IMMATURE GRANULOCYTES #: 0.14 E9/L
IMMATURE GRANULOCYTES %: 2.5 % (ref 0–5)
INFLUENZA A BY PCR: NOT DETECTED
INFLUENZA B BY PCR: NOT DETECTED
LYMPHOCYTES ABSOLUTE: 0.57 E9/L (ref 1.5–4)
LYMPHOCYTES RELATIVE PERCENT: 10.3 % (ref 20–42)
MCH RBC QN AUTO: 29.3 PG (ref 26–35)
MCHC RBC AUTO-ENTMCNC: 32.8 % (ref 32–34.5)
MCV RBC AUTO: 89.4 FL (ref 80–99.9)
MONOCYTES ABSOLUTE: 0.26 E9/L (ref 0.1–0.95)
MONOCYTES RELATIVE PERCENT: 4.7 % (ref 2–12)
MYCOPLASMA PNEUMONIAE BY PCR: NOT DETECTED
NEUTROPHILS ABSOLUTE: 4.58 E9/L (ref 1.8–7.3)
NEUTROPHILS RELATIVE PERCENT: 82.3 % (ref 43–80)
PARAINFLUENZA VIRUS 1 BY PCR: NOT DETECTED
PARAINFLUENZA VIRUS 2 BY PCR: NOT DETECTED
PARAINFLUENZA VIRUS 3 BY PCR: NOT DETECTED
PARAINFLUENZA VIRUS 4 BY PCR: NOT DETECTED
PDW BLD-RTO: 12.9 FL (ref 11.5–15)
PLATELET # BLD: 513 E9/L (ref 130–450)
PMV BLD AUTO: 10.2 FL (ref 7–12)
POLYCHROMASIA: ABNORMAL
POTASSIUM REFLEX MAGNESIUM: 4.6 MMOL/L (ref 3.5–5)
RBC # BLD: 4.26 E12/L (ref 3.8–5.8)
RESPIRATORY SYNCYTIAL VIRUS BY PCR: NOT DETECTED
SARS-COV-2, NAAT: NOT DETECTED
SARS-COV-2, PCR: DETECTED
SEDIMENTATION RATE, ERYTHROCYTE: 94 MM/HR (ref 0–15)
SODIUM BLD-SCNC: 136 MMOL/L (ref 132–146)
TOTAL PROTEIN: 6.8 G/DL (ref 6.4–8.3)
WBC # BLD: 5.6 E9/L (ref 4.5–11.5)

## 2021-09-28 PROCEDURE — 6360000002 HC RX W HCPCS: Performed by: NURSE PRACTITIONER

## 2021-09-28 PROCEDURE — 85651 RBC SED RATE NONAUTOMATED: CPT

## 2021-09-28 PROCEDURE — 87635 SARS-COV-2 COVID-19 AMP PRB: CPT

## 2021-09-28 PROCEDURE — 2580000003 HC RX 258: Performed by: NURSE PRACTITIONER

## 2021-09-28 PROCEDURE — 2060000000 HC ICU INTERMEDIATE R&B

## 2021-09-28 PROCEDURE — 85378 FIBRIN DEGRADE SEMIQUANT: CPT

## 2021-09-28 PROCEDURE — 0202U NFCT DS 22 TRGT SARS-COV-2: CPT

## 2021-09-28 PROCEDURE — 6370000000 HC RX 637 (ALT 250 FOR IP): Performed by: NURSE PRACTITIONER

## 2021-09-28 PROCEDURE — 85025 COMPLETE CBC W/AUTO DIFF WBC: CPT

## 2021-09-28 PROCEDURE — 80053 COMPREHEN METABOLIC PANEL: CPT

## 2021-09-28 PROCEDURE — 36415 COLL VENOUS BLD VENIPUNCTURE: CPT

## 2021-09-28 PROCEDURE — 2700000000 HC OXYGEN THERAPY PER DAY

## 2021-09-28 PROCEDURE — 6360000002 HC RX W HCPCS: Performed by: INTERNAL MEDICINE

## 2021-09-28 PROCEDURE — 87449 NOS EACH ORGANISM AG IA: CPT

## 2021-09-28 PROCEDURE — 99233 SBSQ HOSP IP/OBS HIGH 50: CPT | Performed by: INTERNAL MEDICINE

## 2021-09-28 RX ORDER — PANTOPRAZOLE SODIUM 40 MG/1
40 TABLET, DELAYED RELEASE ORAL
Status: DISCONTINUED | OUTPATIENT
Start: 2021-09-29 | End: 2021-10-05 | Stop reason: HOSPADM

## 2021-09-28 RX ORDER — BUDESONIDE AND FORMOTEROL FUMARATE DIHYDRATE 160; 4.5 UG/1; UG/1
2 AEROSOL RESPIRATORY (INHALATION) 2 TIMES DAILY
Status: DISCONTINUED | OUTPATIENT
Start: 2021-09-28 | End: 2021-09-28

## 2021-09-28 RX ORDER — LEVOFLOXACIN 5 MG/ML
750 INJECTION, SOLUTION INTRAVENOUS EVERY 24 HOURS
Status: DISCONTINUED | OUTPATIENT
Start: 2021-09-28 | End: 2021-09-29

## 2021-09-28 RX ORDER — DEXAMETHASONE SODIUM PHOSPHATE 4 MG/ML
10 INJECTION, SOLUTION INTRA-ARTICULAR; INTRALESIONAL; INTRAMUSCULAR; INTRAVENOUS; SOFT TISSUE EVERY 12 HOURS
Status: DISCONTINUED | OUTPATIENT
Start: 2021-09-28 | End: 2021-10-05 | Stop reason: HOSPADM

## 2021-09-28 RX ORDER — ALBUTEROL SULFATE 90 UG/1
2 AEROSOL, METERED RESPIRATORY (INHALATION) 4 TIMES DAILY
Status: DISCONTINUED | OUTPATIENT
Start: 2021-09-28 | End: 2021-10-05 | Stop reason: HOSPADM

## 2021-09-28 RX ADMIN — BARICITINIB 4 MG: 2 TABLET, FILM COATED ORAL at 17:32

## 2021-09-28 RX ADMIN — ENOXAPARIN SODIUM 30 MG: 30 INJECTION SUBCUTANEOUS at 09:13

## 2021-09-28 RX ADMIN — Medication 10 ML: at 09:13

## 2021-09-28 RX ADMIN — ENOXAPARIN SODIUM 40 MG: 40 INJECTION SUBCUTANEOUS at 21:58

## 2021-09-28 RX ADMIN — ALBUTEROL SULFATE 2 PUFF: 90 AEROSOL, METERED RESPIRATORY (INHALATION) at 17:33

## 2021-09-28 RX ADMIN — Medication 500 MG: at 21:58

## 2021-09-28 RX ADMIN — ACETAMINOPHEN 650 MG: 325 TABLET ORAL at 11:50

## 2021-09-28 RX ADMIN — Medication 500 MG: at 09:13

## 2021-09-28 RX ADMIN — ZINC SULFATE 220 MG (50 MG) CAPSULE 50 MG: CAPSULE at 09:13

## 2021-09-28 RX ADMIN — LEVOFLOXACIN 750 MG: 5 INJECTION, SOLUTION INTRAVENOUS at 14:41

## 2021-09-28 RX ADMIN — ALBUTEROL SULFATE 2 PUFF: 90 AEROSOL, METERED RESPIRATORY (INHALATION) at 21:58

## 2021-09-28 RX ADMIN — Medication 2000 UNITS: at 09:13

## 2021-09-28 RX ADMIN — Medication 10 ML: at 21:58

## 2021-09-28 RX ADMIN — DEXAMETHASONE SODIUM PHOSPHATE 10 MG: 4 INJECTION, SOLUTION INTRAMUSCULAR; INTRAVENOUS at 14:42

## 2021-09-28 ASSESSMENT — PAIN SCALES - GENERAL
PAINLEVEL_OUTOF10: 0
PAINLEVEL_OUTOF10: 7
PAINLEVEL_OUTOF10: 0
PAINLEVEL_OUTOF10: 0

## 2021-09-28 NOTE — CONSULTS
Claudia Swan M.D.,[facility]  Jorge Zhao D.O., F.A.C.O.I., Pooja Harmon M.D. Agatha Cifuentes M.D. Bakari Rolle D.O. Patient:  Benito Castro 45 y.o. male MRN: [id number]     Date of Service: 9/28/2021      PULMONARY CONSULTATION    Reason for Consultation: hypoxia, covid +  Referring Physician: Dr. Shay Sarmiento MD    Communication with the referring physician will be sent via the electronic medical record. Chief Complaint: shortness of breath     CODE STATUS: FULL     SUBJECTIVE:  HPI:  Benito Castro is a 45 y.o.  male who we are asked to evaluate for hypoxia and covid +. He has a limited PMH significant for kidney stone. He is a non smoker. No hx of copd or asthma. No hx of blood clots or stroke. He was sick at home with covid 19 for 10 days. He was treated with Z pack and medrol dose pack at home. Initial symptoms included cough, fever. He is a , and needed a negative covid test to return to work. In urgent care he was found to be hypoxic saturation 81% on RA. He was sent directly to ED for further evaluation. He presented to [facility] on 9/27. He is not vaccinated by choice. He presented with SPO2 88%. Placed on 6 L NC. Increased work of breathing, switched to NRB mask 15 L. CXR with bilateral multifocal pneumonia. Lab testing: WBC 11.5, CRP 17.7, Hgb 13.3, Abs lymphs 0.75, Na 140 K 3.8, BUN 20 Creat 0.9, , AST 51. EKG SR rate 90's. Ferritin 2693, D dimer [id number]. procal 0.18. Personally seen and examined. Feeling ok with breathing. Remains on 15 L O2. Saturation 90% at rest. Attempt to wean to 6 liters , saturation decreased to 86%. + persistent cough, not expectorating. Past Medical History:   Diagnosis Date    Kidney stone        History reviewed. No pertinent surgical history.     Family History   Problem Relation Age of Onset    Cancer Mother     Cancer Father     No Known Problems Brother     No Known Problems Brother        Social History: Social History     Socioeconomic History    Marital status:      Spouse name: Not on file    Number of children: Not on file    Years of education: Not on file    Highest education level: Not on file   Occupational History    Not on file   Tobacco Use    Smoking status: Never Smoker    Smokeless tobacco: Never Used   Vaping Use    Vaping Use: Never used   Substance and Sexual Activity    Alcohol use: Never    Drug use: Never    Sexual activity: Not on file   Other Topics Concern    Not on file   Social History Narrative    Not on file     Social Determinants of Health     Financial Resource Strain:     Difficulty of Paying Living Expenses:    Food Insecurity:     Worried About Running Out of Food in the Last Year:     [address] in the Last Year:    Transportation Needs:     Lack of Transportation (Medical):  Lack of Transportation (Non-Medical):    Physical Activity:     Days of Exercise per Week:     Minutes of Exercise per Session:    Stress:     Feeling of Stress :    Social Connections:     Frequency of Communication with Friends and Family:     Frequency of Social Gatherings with Friends and Family:     Attends Alevism Services:     Active Member of Clubs or Organizations:     Attends Club or Organization Meetings:     Marital Status:    Intimate Partner Violence:     Fear of Current or Ex-Partner:     Emotionally Abused:     Physically Abused:     Sexually Abused:      Smoking history: The patient is a non smoker    ETOH:   reports no history of alcohol use. Exposures: There is no exposure to TB or asbestos. Occupation works as   Vaccines: There is no immunization history on file for this patient.      Home Meds: Medications Prior to Admission: diphenhydrAMINE-APAP, sleep, (EXCEDRIN PM PO), Take by mouth nightly as needed    CURRENT MEDS :  Scheduled Meds:   tiotropium  2 puff Inhalation Daily    budesonide-formoterol  2 puff Inhalation BID    sodium chloride flush  5-40 mL IntraVENous 2 times per day    enoxaparin  30 mg SubCUTAneous BID    Vitamin D  2,000 Units Oral Daily    zinc sulfate  50 mg Oral Daily    ascorbic acid  500 mg Oral BID    dexamethasone  6 mg IntraVENous Q24H    baricitinib  4 mg Oral Daily       Continuous Infusions:   sodium chloride         No Known Allergies    REVIEW OF SYSTEMS:  Constitutional: Denies fever, weight loss, night sweats, and fatigue  Skin: Denies pigmentation, dark lesions, and rashes   HEENT: Denies hearing loss, tinnitus, ear drainage, epistaxis, sore throat, and hoarseness. Cardiovascular: Denies palpitations, chest pain, and chest pressure. Respiratory: Denies  dyspnea at rest, hemoptysis, apnea, and choking. +cough, dyspnea with exertion  Gastrointestinal: Denies nausea, vomiting, poor appetite, diarrhea, heartburn or reflux  Genitourinary: Denies dysuria, frequency, urgency or hematuria  Musculoskeletal: Denies myalgias, muscle weakness, and bone pain  Neurological: Denies dizziness, vertigo, headache, and focal weakness  Psychological: Denies anxiety and depression  Endocrine: Denies heat intolerance and cold intolerance  Hematopoietic/Lymphatic: Denies bleeding problems and blood transfusions    OBJECTIVE:   BP (!) 141/77   Pulse 88   Temp 98.2 °F (36.8 °C) (Axillary)   Resp 16   Ht 5' 4.5\" (1.638 m)   Wt 165 lb (74.8 kg)   SpO2 (!) 86%   BMI 27.88 kg/m²   SpO2 Readings from Last 1 Encounters:   09/28/21 (!) 86%        I/O:  No intake or output data in the 24 hours ending 09/28/21 1128  Vent Information  SpO2: (!) 86 %                Physical Exam:  General: The patient is lying in bed comfortably without any distress.   Breathing is not labored  HEENT: Pupils are equal round and reactive to light, there are no oral lesions and no post-nasal drip   Neck: supple without adenopathy  Cardiovascular: regular rate and rhythm without murmur or gallop  Respiratory: diminished and clear  to auscultation bilaterally without wheezing or crackles. Air entry is symmetric  Abdomen: soft, non-tender, non-distended, normal bowel sounds  Extremities: warm, no edema, no clubbing  Skin: no rash or lesion  Neurologic: CN II-XII grossly intact, no focal deficits    Pulmonary Function Testing not on file     Imaging personally reviewed:  cxr 9/27   The cardiac silhouette is within normals.       Multifocal bilateral pneumonia is noted.  The pneumonia is most prominent   within the lower lobes.  There is no pleural effusion.           Impression   1. Multifocal bilateral pneumonia             Echo:  None     Labs:  Lab Results   Component Value Date    WBC 5.6 09/28/2021    HGB 12.5 09/28/2021    HCT 38.1 09/28/2021    MCV 89.4 09/28/2021    MCH 29.3 09/28/2021    MCHC 32.8 09/28/2021    RDW 12.9 09/28/2021     09/28/2021    MPV 10.2 09/28/2021     Lab Results   Component Value Date     09/28/2021    K 4.6 09/28/2021     09/28/2021    CO2 25 09/28/2021    BUN 19 09/28/2021    CREATININE 0.9 09/28/2021    LABALBU 2.9 09/28/2021    LABALBU 4.3 12/16/2010    CALCIUM 9.2 09/28/2021    GFRAA >60 09/28/2021    LABGLOM >60 09/28/2021     No results found for: PROTIME, INR  No results for input(s): PROBNP in the last 72 hours. No results for input(s): TROPONINI in the last 72 hours. Recent Labs     09/27/21  1631   PROCAL 0.18*     This SmartLink has not been configured with any valid records. Results for Reyna Valiente (MRN [id number]) as of 9/28/2021 13:58   Ref. Range 9/27/2021 15:43   Source: Unknown Blood Arterial   pH, Blood Gas Latest Ref Range: 7.350 - 7.450  7.482 (H)   PCO2 Latest Ref Range: 35.0 - 45.0 mmHg 34.8 (L)   pO2 Latest Ref Range: 75.0 - 100.0 mmHg 74.7 (L)   HCO3 Latest Ref Range: 22.0 - 26.0 mmol/L 25.5   Base Excess Latest Ref Range: -3.0 - 3.0 mmol/L 2.3   O2 Sat Latest Ref Range: 92.0 - 98.5 % 95.5     Micro:  No results for input(s): CULTRESP in the last 72 hours.   No results for input(s): LABGRAM in the last 72 hours. No results for input(s): LEGUR in the last 72 hours. No results for input(s): STREPNEUMAGU in the last 72 hours. No results for input(s): LP1UAG in the last 72 hours. Results for Kena Hogan (MRN [id number]) as of 2021 13:58   Ref. Range 2021 10:20   SARS-CoV-2, NAAT Latest Ref Range: Not Detected  Not Detected       Original date of  Testing: around 21 as OP. Repeat rapid test negative 21  Vaccination status: not vaccinated  Current oxygen delivery system: 15 liters HF   Dexamethasone dosin mg iv daily   start date:  increased to 10 mg iv bid 21  Remdesivir Y/N:   none        start date:  Baricitinib Y/N:       Yes 4 mg po daily     start date 21  Tocilizumab Y/N:     none    date given:  Respiratory cultures Y/N: none     date/results:  Strep pneumoniae and Legionella Urine Antigen test Y/N: results: pending   Antibiotics Y/N with start and stop dates: Levaquin 750 mg daily 21  CRP: 17.7  D Dimer: 7540>7667  Procalcitonin: 0.18   Sed rate: pending  DVT prophylaxis: lovenox 40 mg SQ bid  GI prophylaxis:  protonix  Lung recruitment techniques: incentive spirometer    Assessment:  1. Acute respiratory failure with hypoxia  2. SARS CoV 2 pneumonia -mod to severe  3. Leukopenia, lymphopenia related to viral infection  4. Elevated transaminases    Plan:  1. Oxygen therapy 15 L wean to keep >88%  2. Treatment for COVID: dexamethasone, baricitinib, vitamins  3. Incentive spirometer, lung recruitment techniques  4. Follow cxr imaging  5. Empiric Levaquin 750 mg daily ordered per primary team  6. Stop spiriva and symbicort. Add albuterol QID  7. Increase activity as tolerated  8. Trend inflammatory markers, procal 0.18, d dimer 1797  9. GI prophylaxis -add protonix  Thank you for allowing me to participate in the care of Jude Samuels. Please feel free to call with questions.      This plan of care was reviewed in collaboration with Dr. Chavez [facility]    Electronically signed by JESSICA Pringle CNP on 9/28/2021 at 11:28 AM      Note: This report was completed utilizing computer voice recognition software. Every effort has been made to ensure accuracy, however; inadvertent computerized transcription errors may be present        I personally saw, examined, and cared for the patient. Labs, medications, radiographs reviewed. I agree with history exam and plans detailed in NP note.         Jin Pathak, DO

## 2021-09-28 NOTE — PLAN OF CARE
Problem: Falls - Risk of:  Goal: Will remain free from falls  Description: Will remain free from falls  9/28/2021 0513 by Baljinder Valadez RN  Outcome: Met This Shift  9/27/2021 1751 by Terrance Bunch RN  Outcome: Met This Shift  Goal: Absence of physical injury  Description: Absence of physical injury  9/28/2021 0513 by Baljinder Valadez RN  Outcome: Met This Shift  9/27/2021 1751 by Terrance Bunch RN  Outcome: Met This Shift     Problem: Pain:  Goal: Pain level will decrease  Description: Pain level will decrease  9/28/2021 0513 by Baljinder Valadez RN  Outcome: Met This Shift  9/27/2021 1751 by Terrance Bunch RN  Outcome: Met This Shift  Goal: Control of acute pain  Description: Control of acute pain  9/28/2021 0513 by Baljinder Valadez RN  Outcome: Met This Shift  9/27/2021 1751 by Terrance Bunch RN  Outcome: Met This Shift  Goal: Control of chronic pain  Description: Control of chronic pain  9/28/2021 0513 by Baljinder Valadez RN  Outcome: Met This Shift  9/27/2021 1751 by Terrance Bunch RN  Outcome: Met This Shift     Problem: FLUID AND ELECTROLYTE IMBALANCE  Goal: Fluid and electrolyte balance are achieved/maintained  9/28/2021 0513 by Baljinder Valadez RN  Outcome: Met This Shift  9/27/2021 1751 by Terrance Bunch RN  Outcome: Ongoing     Problem: Gas Exchange - Impaired:  Goal: Respiratory status - gas exchange  9/28/2021 0513 by Baljinder Valadez RN  Outcome: Met This Shift  9/27/2021 1751 by Terrance Bunch RN  Outcome: Ongoing     Problem: Isolation Precautions - Risk of Spread of Infection  Goal: Isolation precautions  Description: Isolation precautions  9/28/2021 0513 by Baljinder Valadez RN  Outcome: Met This Shift  9/27/2021 1751 by Terrance Bunch RN  Outcome: Met This Shift

## 2021-09-28 NOTE — CARE COORDINATION
COVID negative 9/28. Hx + COVID 9/17. Phone conversation w/ patient. Explained role of  and plan of care. Lives w/ his wife in a 2 story house- no steps to entrance. Independent PTA. No Hx DMEs, HHC, or [facility]. Requiring O2 6 liters venti mask-DOES NOT wear home O2- DME list offered and declined- no DME preference- referral made to [facility] DME to follow- will need O2 testing/ order if unable to wean off at discharge. PCP is Dr. Luigi Chandler and pharmacy is [address] [place] [address]. Per pt, plan is to return home on discharge- wife will provide transportation home.  Will follow Long Colon RN case manager

## 2021-09-28 NOTE — PROGRESS NOTES
[facility] Progress Note    Admitting Date and Time: 9/27/2021 10:53 AM  Admit Dx: Acute respiratory failure with hypoxia (HCC) [J96.01]  COVID-19 [U07.1]  Acute respiratory failure due to COVID-19 (HCC) [U07.1, J96.00]    Subjective:  Patient is being followed for Acute respiratory failure with hypoxia ([address] [J96.01]  COVID-19 [U07.1]  Acute respiratory failure due to COVID-19 ([address] [U07.1, J96.00]     No new complaints at this time. He is becoming more hypoxic. Afebrile    ROS: denies fever, chills, cp, sob, n/v, HA unless stated above.       levofloxacin  750 mg IntraVENous Q24H    enoxaparin  40 mg SubCUTAneous BID    dexamethasone  10 mg IntraVENous Q12H    albuterol sulfate HFA  2 puff Inhalation 4x daily    [START ON 9/29/2021] pantoprazole  40 mg Oral QAM AC    sodium chloride flush  5-40 mL IntraVENous 2 times per day    Vitamin D  2,000 Units Oral Daily    zinc sulfate  50 mg Oral Daily    ascorbic acid  500 mg Oral BID    baricitinib  4 mg Oral Daily     sodium chloride flush, 5-40 mL, PRN  sodium chloride, 25 mL, PRN  ondansetron, 4 mg, Q8H PRN   Or  ondansetron, 4 mg, Q6H PRN  polyethylene glycol, 17 g, Daily PRN  acetaminophen, 650 mg, Q6H PRN   Or  acetaminophen, 650 mg, Q6H PRN         Objective:    /73   Pulse 102   Temp 98.2 °F (36.8 °C) (Axillary)   Resp 18   Ht 5' 4.5\" (1.638 m)   Wt 165 lb (74.8 kg)   SpO2 94%   BMI 27.88 kg/m²     General Appearance: alert and oriented to person, place and time and in no acute distress  Skin: warm and dry  Head: normocephalic and atraumatic  Eyes: pupils equal, round, and reactive to light, extraocular eye movements intact, conjunctivae normal  Neck: neck supple and non tender without mass   Pulmonary/Chest: decreased breath sounds  Cardiovascular: normal rate, normal S1 and S2 and no carotid bruits  Abdomen: soft, non-tender, non-distended, normal bowel sounds, no masses or organomegaly  Extremities: no cyanosis, no clubbing and no edema  Neurologic: no cranial nerve deficit and speech normal        Recent Labs     09/27/21  1134 09/28/21  0525    136   K 3.8 4.6    102   CO2 26 25   BUN 20 19   CREATININE 0.9 0.9   GLUCOSE 137* 155*   CALCIUM 9.3 9.2       Recent Labs     09/27/21  1134 09/28/21  0525   WBC 11.5 5.6   RBC 4.54 4.26   HGB 13.3 12.5   HCT 41.3 38.1   MCV 91.0 89.4   MCH 29.3 29.3   MCHC 32.2 32.8   RDW 13.0 12.9   * 513*   MPV 10.3 10.2       Radiology: No new imaging studies    Assessment:    Active Problems:    Acute respiratory failure with hypoxia (HCC)  Resolved Problems:    * No resolved hospital problems. *      Plan:    1. Acute hypoxic respiratory distress 2/2 covid 19 - Patient is on 15 L. Will start him on spiriva and symbicort. Started on empiric levofloxacin. Will repeat procalcitonin tomorrow. If continues to be low then will DC antibiotics. Repeat CXR due to worsening hypoxia. 2. COVID 19 pneumonia - Continue to treat with decadron and baricitinib. Continue vitamin cocktail and inflammatory markers. 3. Pre-Diabetes, A1c 6 - Diet and lifestyle changes. 4. DVT prophylaxis - Lovenox    Encourage ambulation. NOTE: This report was transcribed using voice recognition software. Every effort was made to ensure accuracy; however, inadvertent computerized transcription errors may be present.     Electronically signed by Aristides Henderson DO on 9/28/2021 at 4:13 PM

## 2021-09-29 LAB
C-REACTIVE PROTEIN: 4.6 MG/DL (ref 0–0.4)
D DIMER: 945 NG/ML DDU
L. PNEUMOPHILA SEROGP 1 UR AG: NORMAL
LACTATE DEHYDROGENASE: 300 U/L (ref 135–225)
PROCALCITONIN: 0.08 NG/ML (ref 0–0.08)
STREP PNEUMONIAE ANTIGEN, URINE: NORMAL

## 2021-09-29 PROCEDURE — 6370000000 HC RX 637 (ALT 250 FOR IP): Performed by: NURSE PRACTITIONER

## 2021-09-29 PROCEDURE — 86140 C-REACTIVE PROTEIN: CPT

## 2021-09-29 PROCEDURE — 85378 FIBRIN DEGRADE SEMIQUANT: CPT

## 2021-09-29 PROCEDURE — 6360000002 HC RX W HCPCS: Performed by: INTERNAL MEDICINE

## 2021-09-29 PROCEDURE — 2700000000 HC OXYGEN THERAPY PER DAY

## 2021-09-29 PROCEDURE — 2580000003 HC RX 258: Performed by: NURSE PRACTITIONER

## 2021-09-29 PROCEDURE — 36415 COLL VENOUS BLD VENIPUNCTURE: CPT

## 2021-09-29 PROCEDURE — 2060000000 HC ICU INTERMEDIATE R&B

## 2021-09-29 PROCEDURE — 84145 PROCALCITONIN (PCT): CPT

## 2021-09-29 PROCEDURE — 6360000002 HC RX W HCPCS: Performed by: NURSE PRACTITIONER

## 2021-09-29 PROCEDURE — 99233 SBSQ HOSP IP/OBS HIGH 50: CPT | Performed by: INTERNAL MEDICINE

## 2021-09-29 PROCEDURE — 83615 LACTATE (LD) (LDH) ENZYME: CPT

## 2021-09-29 RX ADMIN — ENOXAPARIN SODIUM 40 MG: 40 INJECTION SUBCUTANEOUS at 20:53

## 2021-09-29 RX ADMIN — DEXAMETHASONE SODIUM PHOSPHATE 10 MG: 4 INJECTION, SOLUTION INTRAMUSCULAR; INTRAVENOUS at 02:13

## 2021-09-29 RX ADMIN — Medication 500 MG: at 08:17

## 2021-09-29 RX ADMIN — DEXAMETHASONE SODIUM PHOSPHATE 10 MG: 4 INJECTION, SOLUTION INTRAMUSCULAR; INTRAVENOUS at 15:53

## 2021-09-29 RX ADMIN — ZINC SULFATE 220 MG (50 MG) CAPSULE 50 MG: CAPSULE at 08:17

## 2021-09-29 RX ADMIN — ALBUTEROL SULFATE 2 PUFF: 90 AEROSOL, METERED RESPIRATORY (INHALATION) at 20:57

## 2021-09-29 RX ADMIN — ALBUTEROL SULFATE 2 PUFF: 90 AEROSOL, METERED RESPIRATORY (INHALATION) at 08:16

## 2021-09-29 RX ADMIN — Medication 10 ML: at 08:19

## 2021-09-29 RX ADMIN — ACETAMINOPHEN 650 MG: 325 TABLET ORAL at 12:10

## 2021-09-29 RX ADMIN — ENOXAPARIN SODIUM 40 MG: 40 INJECTION SUBCUTANEOUS at 08:17

## 2021-09-29 RX ADMIN — ALBUTEROL SULFATE 2 PUFF: 90 AEROSOL, METERED RESPIRATORY (INHALATION) at 15:53

## 2021-09-29 RX ADMIN — Medication 2000 UNITS: at 08:17

## 2021-09-29 RX ADMIN — PANTOPRAZOLE SODIUM 40 MG: 40 TABLET, DELAYED RELEASE ORAL at 06:00

## 2021-09-29 RX ADMIN — BARICITINIB 4 MG: 2 TABLET, FILM COATED ORAL at 08:17

## 2021-09-29 RX ADMIN — Medication 500 MG: at 20:57

## 2021-09-29 RX ADMIN — SODIUM CHLORIDE, PRESERVATIVE FREE 10 ML: 5 INJECTION INTRAVENOUS at 15:53

## 2021-09-29 RX ADMIN — Medication 10 ML: at 20:53

## 2021-09-29 ASSESSMENT — PAIN SCALES - GENERAL
PAINLEVEL_OUTOF10: 6
PAINLEVEL_OUTOF10: 0

## 2021-09-29 NOTE — PROGRESS NOTES
Devendra Kebede M.D.,[facility]  Yaquelin Abarca D.O., F.A.C.O.I., Stephanie Oliveira M.D. Asif Dickens M.D. Yolette Castellon D.O. Daily Pulmonary Progress Note    Patient:  Joe Tavera 45 y.o. male MRN: [id number]     Date of Service: 9/29/2021      Synopsis     We are following patient for hypoxia, COVID +    \"CC\" shortness of breath    Code status: Full      Subjective      Patient was seen through window. Phone interview conducted. Lying in bed 90% on NRB without distress. Patient states he is feeling better. Patient states he is unable to tolerate nasal cannula. Okay for respiratory to put him on a Ventimask in order to titrate his oxygen down. Patient states he has not been able to use his incentive spirometer due to wearing a mask. I encouraged him to move around in bed and get up to a chair. Review of Systems:  Constitutional: Denies fever, weight loss, night sweats, and fatigue  Skin: Denies pigmentation, dark lesions, and rashes   HEENT: Denies hearing loss, tinnitus, ear drainage, epistaxis, sore throat, and hoarseness. Cardiovascular: Denies palpitations, chest pain, and chest pressure.   Respiratory: Denies dyspnea at rest, hemoptysis, apnea, and choking. + Cough, CORREA  Gastrointestinal: Denies nausea, vomiting, poor appetite, diarrhea, heartburn or reflux  Genitourinary: Denies dysuria, frequency, urgency or hematuria  Musculoskeletal: Denies myalgias, muscle weakness, and bone pain  Neurological: Denies dizziness, vertigo, headache, and focal weakness  Psychological: Denies anxiety and depression  Endocrine: Denies heat intolerance and cold intolerance  Hematopoietic/Lymphatic: Denies bleeding problems and blood transfusions    24-hour events:  None    Objective   Vitals: /76   Pulse 96   Temp 98.1 °F (36.7 °C) (Axillary)   Resp 20   Ht 5' 4.5\" (1.638 m)   Wt 165 lb (74.8 kg)   SpO2 90%   BMI 27.88 kg/m²     I/O:    Intake/Output Summary (Last 24 hours) at 9/29/2021 Mario Laurent [id number] filed at 9/29/2021 4226  Gross per 24 hour   Intake --   Output 800 ml   Net -800 ml       Vent Information  SpO2: 90 %      CURRENT MEDS :  Scheduled Meds:   enoxaparin  40 mg SubCUTAneous BID    dexamethasone  10 mg IntraVENous Q12H    albuterol sulfate HFA  2 puff Inhalation 4x daily    pantoprazole  40 mg Oral QAM AC    sodium chloride flush  5-40 mL IntraVENous 2 times per day    Vitamin D  2,000 Units Oral Daily    zinc sulfate  50 mg Oral Daily    ascorbic acid  500 mg Oral BID    baricitinib  4 mg Oral Daily       Physical Exam:  Due to the current efforts to prevent transmission of COVID-19 and also the need to preserve PPE for other caregivers, a face-to-face encounter with the patient was not performed. That being said, all relevant records and diagnostic tests were reviewed, including laboratory results and imaging. Please reference any relevant documentation elsewhere. Care will be coordinated with the primary service. Pertinent/ New Labs and Imaging Studies     Imaging Personally Reviewed:  CXR 9/27  The cardiac silhouette is within normals. Multifocal bilateral pneumonia is noted. The pneumonia is most prominent   within the lower lobes. There is no pleural effusion. ECHO none on file      Labs:  Lab Results   Component Value Date    WBC 5.6 09/28/2021    HGB 12.5 09/28/2021    HCT 38.1 09/28/2021    MCV 89.4 09/28/2021    MCH 29.3 09/28/2021    MCHC 32.8 09/28/2021    RDW 12.9 09/28/2021     09/28/2021    MPV 10.2 09/28/2021     Lab Results   Component Value Date     09/28/2021    K 4.6 09/28/2021     09/28/2021    CO2 25 09/28/2021    BUN 19 09/28/2021    CREATININE 0.9 09/28/2021    LABALBU 2.9 09/28/2021    LABALBU 4.3 12/16/2010    CALCIUM 9.2 09/28/2021    GFRAA >60 09/28/2021    LABGLOM >60 09/28/2021     No results found for: PROTIME, INR  No results for input(s): PROBNP in the last 72 hours.   Recent Labs     09/29/21  0430   PROCAL 0.08 This SmartLink has not been configured with any valid records. Micro:  9/28 respiratory panel positive for Covid-19   No results for input(s): CULTRESP in the last 72 hours. No results for input(s): LABGRAM in the last 72 hours. No results for input(s): LEGUR in the last 72 hours. Recent Labs     09/28/21  1500   STREPNEUMAGU Presumptive negative- suggests no current or recent  pneumococcal infection. Infection due to Strep pneumoniae cannot be  ruled out since the antigen present in the sample  may be below the detection limit of the test.  Normal Range:Presumptive Negative       Recent Labs     09/28/21  1500   LP1UAG Presumptive Negative -suggesting no recent or current infections  with Legionella pneumophila serogroup 1. Infection to Legionella cannot be ruled out since other serogroups  and species may cause infection, antigen may not be present in  early infection, or level of antigen may be below the  detection limit. Normal Range: Presumptive Negative       Original date of  Testing: around 9/17/21 as OP.  9/28 Covid positive on respiratory panel  Vaccination status: not vaccinated  Current oxygen delivery system: NRB   Dexamethasone dosing:  10 mg IV BID start date: 9/27  Remdesivir Y/N: none       Baricitinib Y/N:  4 mg po daily  start date: 9/27/21  Tocilizumab Y/N: none    Respiratory cultures Y/N: none       Strep pneumoniae and Legionella Urine Antigen test Y/N: results: Negative  Antibiotics: Levaquin 750 mg daily started 9/28, got 1 dose   CRP: 17.7-> 4.6  D Dimer: 1940>1797-> 945  Procalcitonin: 0.18-> .08   Sed rate: 94  DVT prophylaxis: lovenox 40 mg SQ bid  GI prophylaxis:  protonix  Lung recruitment techniques: incentive spirometer     Assessment:    Acute respiratory failure with hypoxia  SARS CoV 2 pneumonia -mod to severe  Leukopenia, lymphopenia related to viral infection  Elevated transaminases      Plan:   Oxygen therapy 15 L wean to keep >88%.  Patient doesn't tolerate NC,

## 2021-09-29 NOTE — CARE COORDINATION
COVID positive 9/28. Hx + COVID 9/17. Requiring O2 15 liters non-rebreather- [facility] DME following- will need O2 testing/ order if unable to wean off at discharge. Plan remains to return home on discharge- wife will provide transportation home.  Will follow Kraen Duvall RN case manager

## 2021-09-29 NOTE — PROGRESS NOTES
[facility] Progress Note    Admitting Date and Time: 9/27/2021 10:53 AM  Admit Dx: Acute respiratory failure with hypoxia (HCC) [J96.01]  COVID-19 [U07.1]  Acute respiratory failure due to COVID-19 (HCC) [U07.1, J96.00]    Subjective:  Patient is being followed for Acute respiratory failure with hypoxia ([address] [J96.01]  COVID-19 [U07.1]  Acute respiratory failure due to COVID-19 ([address] [U07.1, J96.00]     Afebrile through the night    ROS: denies fever, chills, cp, sob, n/v, HA unless stated above.       levofloxacin  750 mg IntraVENous Q24H    enoxaparin  40 mg SubCUTAneous BID    dexamethasone  10 mg IntraVENous Q12H    albuterol sulfate HFA  2 puff Inhalation 4x daily    pantoprazole  40 mg Oral QAM AC    sodium chloride flush  5-40 mL IntraVENous 2 times per day    Vitamin D  2,000 Units Oral Daily    zinc sulfate  50 mg Oral Daily    ascorbic acid  500 mg Oral BID    baricitinib  4 mg Oral Daily     sodium chloride flush, 5-40 mL, PRN  sodium chloride, 25 mL, PRN  ondansetron, 4 mg, Q8H PRN   Or  ondansetron, 4 mg, Q6H PRN  polyethylene glycol, 17 g, Daily PRN  acetaminophen, 650 mg, Q6H PRN   Or  acetaminophen, 650 mg, Q6H PRN         Objective:    /76   Pulse 96   Temp 98.1 °F (36.7 °C) (Axillary)   Resp 20   Ht 5' 4.5\" (1.638 m)   Wt 165 lb (74.8 kg)   SpO2 90%   BMI 27.88 kg/m²     General Appearance: alert and oriented to person, place and time and in no acute distress  Skin: warm and dry  Head: normocephalic and atraumatic  Eyes: pupils equal, round, and reactive to light, extraocular eye movements intact, conjunctivae normal  Neck: neck supple and non tender without mass   Pulmonary/Chest: clear to auscultation bilaterally- no wheezes, rales or rhonchi, normal air movement, no respiratory distress  Cardiovascular: normal rate, normal S1 and S2 and no carotid bruits  Abdomen: soft, non-tender, non-distended, normal bowel sounds, no masses or organomegaly  Extremities: no cyanosis, no clubbing and no edema  Neurologic: no cranial nerve deficit and speech normal        Recent Labs     09/27/21  1134 09/28/21  0525    136   K 3.8 4.6    102   CO2 26 25   BUN 20 19   CREATININE 0.9 0.9   GLUCOSE 137* 155*   CALCIUM 9.3 9.2       Recent Labs     09/27/21  1134 09/28/21  0525   WBC 11.5 5.6   RBC 4.54 4.26   HGB 13.3 12.5   HCT 41.3 38.1   MCV 91.0 89.4   MCH 29.3 29.3   MCHC 32.2 32.8   RDW 13.0 12.9   * 513*   MPV 10.3 10.2       Radiology: No new imaging studies    Assessment:    Active Problems:    Acute respiratory failure with hypoxia (HCC)  Resolved Problems:    * No resolved hospital problems. *      Plan:    1. Acute hypoxic respiratory distress 2/2 covid 19 - Patient is on 15 L. Continue spiriva and symbicort. Repeat procal low and so will DC antibiotics. Repeat CXR due to worsening hypoxia. 2. COVID 19 pneumonia - Continue to treat with decadron and baricitinib. Continue vitamin cocktail and inflammatory markers. 3. Pre-Diabetes, A1c 6 - Diet and lifestyle changes. 4. DVT prophylaxis - Lovenox     Encourage ambulation      NOTE: This report was transcribed using voice recognition software. Every effort was made to ensure accuracy; however, inadvertent computerized transcription errors may be present.   Electronically signed by Gagan Pappas DO on 9/29/2021 at 9:05 AM

## 2021-09-30 ENCOUNTER — APPOINTMENT (OUTPATIENT)
Dept: GENERAL RADIOLOGY | Age: 51
DRG: 177 | End: 2021-09-30
Payer: COMMERCIAL

## 2021-09-30 LAB
LACTATE DEHYDROGENASE: 292 U/L (ref 135–225)
METER GLUCOSE: 185 MG/DL (ref 74–99)
SEDIMENTATION RATE, ERYTHROCYTE: 100 MM/HR (ref 0–15)

## 2021-09-30 PROCEDURE — 36415 COLL VENOUS BLD VENIPUNCTURE: CPT

## 2021-09-30 PROCEDURE — 83615 LACTATE (LD) (LDH) ENZYME: CPT

## 2021-09-30 PROCEDURE — 6370000000 HC RX 637 (ALT 250 FOR IP): Performed by: NURSE PRACTITIONER

## 2021-09-30 PROCEDURE — 85651 RBC SED RATE NONAUTOMATED: CPT

## 2021-09-30 PROCEDURE — 2700000000 HC OXYGEN THERAPY PER DAY

## 2021-09-30 PROCEDURE — 6360000002 HC RX W HCPCS: Performed by: NURSE PRACTITIONER

## 2021-09-30 PROCEDURE — 82962 GLUCOSE BLOOD TEST: CPT

## 2021-09-30 PROCEDURE — 99233 SBSQ HOSP IP/OBS HIGH 50: CPT | Performed by: INTERNAL MEDICINE

## 2021-09-30 PROCEDURE — 71045 X-RAY EXAM CHEST 1 VIEW: CPT

## 2021-09-30 PROCEDURE — 6360000002 HC RX W HCPCS: Performed by: INTERNAL MEDICINE

## 2021-09-30 PROCEDURE — 2060000000 HC ICU INTERMEDIATE R&B

## 2021-09-30 PROCEDURE — 2580000003 HC RX 258: Performed by: NURSE PRACTITIONER

## 2021-09-30 RX ADMIN — ALBUTEROL SULFATE 2 PUFF: 90 AEROSOL, METERED RESPIRATORY (INHALATION) at 16:30

## 2021-09-30 RX ADMIN — ENOXAPARIN SODIUM 40 MG: 40 INJECTION SUBCUTANEOUS at 19:53

## 2021-09-30 RX ADMIN — ALBUTEROL SULFATE 2 PUFF: 90 AEROSOL, METERED RESPIRATORY (INHALATION) at 12:04

## 2021-09-30 RX ADMIN — BARICITINIB 4 MG: 2 TABLET, FILM COATED ORAL at 08:23

## 2021-09-30 RX ADMIN — Medication 500 MG: at 19:53

## 2021-09-30 RX ADMIN — Medication 10 ML: at 19:53

## 2021-09-30 RX ADMIN — Medication 500 MG: at 08:23

## 2021-09-30 RX ADMIN — DEXAMETHASONE SODIUM PHOSPHATE 10 MG: 4 INJECTION, SOLUTION INTRAMUSCULAR; INTRAVENOUS at 01:43

## 2021-09-30 RX ADMIN — DEXAMETHASONE SODIUM PHOSPHATE 10 MG: 4 INJECTION, SOLUTION INTRAMUSCULAR; INTRAVENOUS at 14:36

## 2021-09-30 RX ADMIN — ALBUTEROL SULFATE 2 PUFF: 90 AEROSOL, METERED RESPIRATORY (INHALATION) at 20:08

## 2021-09-30 RX ADMIN — ALBUTEROL SULFATE 2 PUFF: 90 AEROSOL, METERED RESPIRATORY (INHALATION) at 08:24

## 2021-09-30 RX ADMIN — Medication 10 ML: at 08:23

## 2021-09-30 RX ADMIN — Medication 2000 UNITS: at 08:23

## 2021-09-30 RX ADMIN — PANTOPRAZOLE SODIUM 40 MG: 40 TABLET, DELAYED RELEASE ORAL at 06:13

## 2021-09-30 RX ADMIN — ZINC SULFATE 220 MG (50 MG) CAPSULE 50 MG: CAPSULE at 08:23

## 2021-09-30 RX ADMIN — ENOXAPARIN SODIUM 40 MG: 40 INJECTION SUBCUTANEOUS at 08:23

## 2021-09-30 ASSESSMENT — PAIN SCALES - GENERAL
PAINLEVEL_OUTOF10: 0

## 2021-09-30 NOTE — PROGRESS NOTES
Patients O2 saturation, 96% on 15 liter highflo via nonrebreather (d/t unable to tolerate NC because of previous nose surgery. O2 turned down to 11L highflo, O2 saturation 94%, maintained and stable, patient tolerating well with no c/o. Patient does desaturate when ambulating to 78%, he recovers quickly at this time.

## 2021-09-30 NOTE — CARE COORDINATION
COVID positive 9/28. O2 weaned to 6 liters/ 30% venti mask-[facility] DME following- will need O2 testing/ order if unable to wean off at discharge. Plan remains to return home on discharge- wife will provide transportation home.  Will follow  Mckenna Mcclure RN case manager

## 2021-09-30 NOTE — PROGRESS NOTES
edema  Neurologic: no cranial nerve deficit and speech normal        Recent Labs     09/28/21  0525      K 4.6      CO2 25   BUN 19   CREATININE 0.9   GLUCOSE 155*   CALCIUM 9.2       Recent Labs     09/28/21  0525   WBC 5.6   RBC 4.26   HGB 12.5   HCT 38.1   MCV 89.4   MCH 29.3   MCHC 32.8   RDW 12.9   *   MPV 10.2         Assessment:    Active Problems:    Acute respiratory failure with hypoxia (HCC)  Resolved Problems:    * No resolved hospital problems. *      Plan:  1. Acute hypoxic aspiratory failure due to COVID-19 pneumonia, patient O2 sats was in the 80s on presentation, was requiring 15 L nasal cannula to maintain oxygen saturation above 90%, currently on Ventimask 30% FiO2 with ox saturation 90%. Treated underlying process and weaning of oxygen. 2.  COVID-19 pneumonia, started patient on Decadron and baricitinib, D-dimer trending down CRP trended down from 17 to 4   3. Prediabetes A1c at 6, diet and lifestyle modification. NOTE: This report was transcribed using voice recognition software. Every effort was made to ensure accuracy; however, inadvertent computerized transcription errors may be present.   Electronically signed by Niyah Fernández MD on 9/30/2021 at 12:09 PM

## 2021-09-30 NOTE — PROGRESS NOTES
Arya Subramanian M.D.,[facility]  Ghanshyam Wetzel D.O., F.AÁNGEL., Sinai Gusman M.D. Renetta Robertson M.D. Antony New D.O. Daily Pulmonary Progress Note    Patient:  Marlo Vickers 45 y.o. male MRN: [id number]     Date of Service: 9/30/2021      Synopsis     We are following patient for hypoxia, COVID +    \"CC\" shortness of breath    Code status: Full      Subjective      Patient was seen and examined. Laying in bed on Ventimask without distress. Patient still states he will not tolerate nasal cannula and prefers to wear his mask. Please continue to wean oxygen on simple mask. I encouraged patient to continue to be active. Patient states he is more active in his room today. He states he feels better and has not had a cough. Review of Systems:  Constitutional: Denies fever, weight loss, night sweats, and fatigue  Skin: Denies pigmentation, dark lesions, and rashes   HEENT: Denies hearing loss, tinnitus, ear drainage, epistaxis, sore throat, and hoarseness. Cardiovascular: Denies palpitations, chest pain, and chest pressure. Respiratory: Denies dyspnea at rest, hemoptysis, apnea, and choking.   CORREA-improved  Gastrointestinal: Denies nausea, vomiting, poor appetite, diarrhea, heartburn or reflux  Genitourinary: Denies dysuria, frequency, urgency or hematuria  Musculoskeletal: Denies myalgias, muscle weakness, and bone pain  Neurological: Denies dizziness, vertigo, headache, and focal weakness  Psychological: Denies anxiety and depression  Endocrine: Denies heat intolerance and cold intolerance  Hematopoietic/Lymphatic: Denies bleeding problems and blood transfusions    24-hour events:  None    Objective   Vitals: /76   Pulse 91   Temp 97.9 °F (36.6 °C) (Axillary)   Resp 28   Ht 5' 4.5\" (1.638 m)   Wt 165 lb (74.8 kg)   SpO2 96%   BMI 27.88 kg/m²     I/O:    Intake/Output Summary (Last 24 hours) at 9/30/2021 3033  Last data filed at 9/30/2021 0511  Gross per 24 hour   Intake 10 ml   Output 350 ml   Net -340 ml       Vent Information  FiO2 : 50 %  SpO2: 96 %  SpO2/FiO2 ratio: 192      CURRENT MEDS :  Scheduled Meds:   enoxaparin  40 mg SubCUTAneous BID    dexamethasone  10 mg IntraVENous Q12H    albuterol sulfate HFA  2 puff Inhalation 4x daily    pantoprazole  40 mg Oral QAM AC    sodium chloride flush  5-40 mL IntraVENous 2 times per day    Vitamin D  2,000 Units Oral Daily    zinc sulfate  50 mg Oral Daily    ascorbic acid  500 mg Oral BID    baricitinib  4 mg Oral Daily       Physical Exam:  General: The patient is lying in bed comfortably without any distress. Breathing is not labored  HEENT: Pupils are equal round and reactive to light, there are no oral lesions and no post-nasal drip   Neck: supple without adenopathy  Cardiovascular: regular rate and rhythm without murmur or gallop  Respiratory: diminished and clear  to auscultation bilaterally without wheezing or crackles. Air entry is symmetric  Abdomen: soft, non-tender, non-distended, normal bowel sounds  Extremities: warm, no edema, no clubbing  Skin: no rash or lesion  Neurologic: CN II-XII grossly intact, no focal deficits     Pertinent/ New Labs and Imaging Studies     Imaging Personally Reviewed:  CXR 9/30  The heart is mildly enlarged. Patchy increased opacity is present in the mid   and lower lungs. Some clearing has occurred. No abnormal vascular   congestion or pleural effusion. Lung volumes are low.         ECHO none on file      Labs:  Lab Results   Component Value Date    WBC 5.6 09/28/2021    HGB 12.5 09/28/2021    HCT 38.1 09/28/2021    MCV 89.4 09/28/2021    MCH 29.3 09/28/2021    MCHC 32.8 09/28/2021    RDW 12.9 09/28/2021     09/28/2021    MPV 10.2 09/28/2021     Lab Results   Component Value Date     09/28/2021    K 4.6 09/28/2021     09/28/2021    CO2 25 09/28/2021    BUN 19 09/28/2021    CREATININE 0.9 09/28/2021    LABALBU 2.9 09/28/2021    LABALBU 4.3 12/16/2010    CALCIUM 9.2 with hypoxia  SARS CoV 2 pneumonia -mod to severe  Leukopenia, lymphopenia related to viral infection  Elevated transaminases      Plan:   Oxygen therapy 50% venti-mask wean to keep >88%. Patient doesn't tolerate NC  Treatment for COVID: dexamethasone, baricitinib, vitamins  Incentive spirometer, lung recruitment techniques  Follow cxr imaging, improved today  Albuterol QID  Increase activity as tolerated  Trend inflammatory markers  GI prophylaxis- Protonix      This plan of care was reviewed in collaboration with Dr. Arben Hampton  Electronically signed by JESSICA Johnson CNP on 9/30/2021 at 4:53 PM      I personally saw, examined, and cared for the patient. Labs, medications, radiographs reviewed. I agree with history exam and plans detailed in NP note.         Miguelangel Arraiga, DO

## 2021-10-01 LAB
C-REACTIVE PROTEIN: 1.3 MG/DL (ref 0–0.4)
D DIMER: 603 NG/ML DDU
LACTATE DEHYDROGENASE: 615 U/L (ref 135–225)
PROCALCITONIN: 0.07 NG/ML (ref 0–0.08)

## 2021-10-01 PROCEDURE — 84145 PROCALCITONIN (PCT): CPT

## 2021-10-01 PROCEDURE — 99233 SBSQ HOSP IP/OBS HIGH 50: CPT | Performed by: INTERNAL MEDICINE

## 2021-10-01 PROCEDURE — 83615 LACTATE (LD) (LDH) ENZYME: CPT

## 2021-10-01 PROCEDURE — 2700000000 HC OXYGEN THERAPY PER DAY

## 2021-10-01 PROCEDURE — 2060000000 HC ICU INTERMEDIATE R&B

## 2021-10-01 PROCEDURE — 86140 C-REACTIVE PROTEIN: CPT

## 2021-10-01 PROCEDURE — 36415 COLL VENOUS BLD VENIPUNCTURE: CPT

## 2021-10-01 PROCEDURE — 6370000000 HC RX 637 (ALT 250 FOR IP): Performed by: NURSE PRACTITIONER

## 2021-10-01 PROCEDURE — 2580000003 HC RX 258: Performed by: NURSE PRACTITIONER

## 2021-10-01 PROCEDURE — 6360000002 HC RX W HCPCS: Performed by: INTERNAL MEDICINE

## 2021-10-01 PROCEDURE — 85378 FIBRIN DEGRADE SEMIQUANT: CPT

## 2021-10-01 PROCEDURE — 6360000002 HC RX W HCPCS: Performed by: NURSE PRACTITIONER

## 2021-10-01 RX ADMIN — DEXAMETHASONE SODIUM PHOSPHATE 10 MG: 4 INJECTION, SOLUTION INTRAMUSCULAR; INTRAVENOUS at 14:53

## 2021-10-01 RX ADMIN — DEXAMETHASONE SODIUM PHOSPHATE 10 MG: 4 INJECTION, SOLUTION INTRAMUSCULAR; INTRAVENOUS at 14:52

## 2021-10-01 RX ADMIN — ALBUTEROL SULFATE 2 PUFF: 90 AEROSOL, METERED RESPIRATORY (INHALATION) at 17:00

## 2021-10-01 RX ADMIN — ALBUTEROL SULFATE 2 PUFF: 90 AEROSOL, METERED RESPIRATORY (INHALATION) at 09:43

## 2021-10-01 RX ADMIN — ENOXAPARIN SODIUM 40 MG: 40 INJECTION SUBCUTANEOUS at 09:43

## 2021-10-01 RX ADMIN — ZINC SULFATE 220 MG (50 MG) CAPSULE 50 MG: CAPSULE at 09:43

## 2021-10-01 RX ADMIN — ALBUTEROL SULFATE 2 PUFF: 90 AEROSOL, METERED RESPIRATORY (INHALATION) at 14:53

## 2021-10-01 RX ADMIN — Medication 10 ML: at 09:43

## 2021-10-01 RX ADMIN — Medication 500 MG: at 09:42

## 2021-10-01 RX ADMIN — PANTOPRAZOLE SODIUM 40 MG: 40 TABLET, DELAYED RELEASE ORAL at 06:08

## 2021-10-01 RX ADMIN — Medication 10 ML: at 20:41

## 2021-10-01 RX ADMIN — BARICITINIB 4 MG: 2 TABLET, FILM COATED ORAL at 09:42

## 2021-10-01 RX ADMIN — ALBUTEROL SULFATE 2 PUFF: 90 AEROSOL, METERED RESPIRATORY (INHALATION) at 20:40

## 2021-10-01 RX ADMIN — Medication 2000 UNITS: at 09:42

## 2021-10-01 RX ADMIN — Medication 500 MG: at 20:41

## 2021-10-01 RX ADMIN — DEXAMETHASONE SODIUM PHOSPHATE 10 MG: 4 INJECTION, SOLUTION INTRAMUSCULAR; INTRAVENOUS at 02:16

## 2021-10-01 ASSESSMENT — PAIN SCALES - GENERAL
PAINLEVEL_OUTOF10: 0

## 2021-10-01 NOTE — CARE COORDINATION
COVID positive 9/28. Requiring O2 15liters/ 50% venti mask-[facility] D[facility] following- will need O2 testing/ order if unable to wean off at discharge. Plan remains to return home on discharge- wife will provide transportation home.  Will follow  Alli Ruvalcaba RN case manager

## 2021-10-01 NOTE — PROGRESS NOTES
[facility] Progress Note    Admitting Date and Time: 9/27/2021 10:53 AM  Admit Dx: Acute respiratory failure with hypoxia (HCC) [J96.01]  COVID-19 [U07.1]  Acute respiratory failure due to COVID-19 (HCC) [U07.1, J96.00]    Subjective:  Patient is being followed for Acute respiratory failure with hypoxia ([address] [J96.01]  COVID-19 [U07.1]  Acute respiratory failure due to COVID-19 ([address] [U07.1, J96.00]   Pt feels okay, feels more comfortable with the Ventimask, less short of breath, no cough    ROS: denies fever, chills, n/v, HA unless stated above.       [START ON 10/2/2021] enoxaparin  40 mg SubCUTAneous Daily    dexamethasone  10 mg IntraVENous Q12H    albuterol sulfate HFA  2 puff Inhalation 4x daily    pantoprazole  40 mg Oral QAM AC    sodium chloride flush  5-40 mL IntraVENous 2 times per day    Vitamin D  2,000 Units Oral Daily    zinc sulfate  50 mg Oral Daily    ascorbic acid  500 mg Oral BID    baricitinib  4 mg Oral Daily     sodium chloride, 1 spray, Q2H PRN  sodium chloride flush, 5-40 mL, PRN  sodium chloride, 25 mL, PRN  ondansetron, 4 mg, Q8H PRN   Or  ondansetron, 4 mg, Q6H PRN  polyethylene glycol, 17 g, Daily PRN  acetaminophen, 650 mg, Q6H PRN   Or  acetaminophen, 650 mg, Q6H PRN         Objective:    /86   Pulse 97   Temp 98.2 °F (36.8 °C) (Axillary)   Resp 22   Ht 5' 4.5\" (1.638 m)   Wt 165 lb (74.8 kg)   SpO2 93%   BMI 27.88 kg/m²     General Appearance: alert and oriented to person, place and time   Skin: warm and dry  Head: normocephalic and atraumatic  Eyes: pupils equal, round, and reactive to light, extraocular eye movements intact, conjunctivae normal  Neck: neck supple and non tender without mass   Pulmonary/Chest: Diminished bilaterally- no wheezes, rales or rhonchi  Cardiovascular: normal rate, normal S1 and S2 and no carotid bruits  Abdomen: soft, non-tender, non-distended, normal bowel sounds, no masses or organomegaly  Extremities: no cyanosis, no clubbing and no edema  Neurologic: no cranial nerve deficit and speech normal        No results for input(s): NA, K, CL, CO2, BUN, CREATININE, GLUCOSE, CALCIUM in the last 72 hours. No results for input(s): WBC, RBC, HGB, HCT, MCV, MCH, MCHC, RDW, PLT, MPV in the last 72 hours. Assessment:    Active Problems:    Acute respiratory failure with hypoxia (HCC)  Resolved Problems:    * No resolved hospital problems. *      Plan:  1. Acute hypoxic aspiratory failure due to COVID-19 pneumonia, patient O2 sats was in the 80s on presentation, was requiring 15 L nasal cannula to maintain oxygen saturation above 90%, currently on Ventimask 50% FiO2 with ox saturation 90%. Treated underlying process and weaning of oxygen. 2.  COVID-19 pneumonia, started patient on Decadron and baricitinib, D-dimer trending down CRP trended down from 17 to 4   3. Prediabetes A1c at 6, diet and lifestyle modification. NOTE: This report was transcribed using voice recognition software. Every effort was made to ensure accuracy; however, inadvertent computerized transcription errors may be present.   Electronically signed by Merline Face, MD on 10/1/2021 at 11:56 AM

## 2021-10-01 NOTE — PROGRESS NOTES
PULSE OX ON ROOM AIR SITTING 87%   PULSE OX ON ROOM AIR AMBULATING 78%   PULSE OX ON 11 LITERS AMBULATING RECOVERY 91%   PULSE OX ON 12 LITERS SITTING RECOVERY 90%

## 2021-10-01 NOTE — PROGRESS NOTES
Miguel Ángel Mccracken M.D.,[facility]  Navjot Irene D.O., F.GHASSANOKJ, Lennox Hammond M.D. Kenny Figueredo M.D. Queta Medina D.O. Daily Pulmonary Progress Note    Patient:  Stephanie Ek 45 y.o. male MRN: [id number]     Date of Service: 10/1/2021      Synopsis     We are following patient for hypoxia, COVID +    \"CC\" shortness of breath    Code status: Full      Subjective      Patient was seen and examined. Laying in bed on Ventimask  50% without distress. Patient still states he will not tolerate nasal cannula and prefers to wear his mask. Please continue to wean oxygen on simple mask. I encouraged patient to continue to be active. Patient states he is more active in his room today. He states he feels better and has not had a cough. Review of Systems:  Constitutional: Denies fever, weight loss, night sweats, and fatigue  Skin: Denies pigmentation, dark lesions, and rashes   HEENT: Denies hearing loss, tinnitus, ear drainage, epistaxis, sore throat, and hoarseness. Cardiovascular: Denies palpitations, chest pain, and chest pressure. Respiratory: Denies dyspnea at rest, hemoptysis, apnea, and choking.   CORREA-improved  Gastrointestinal: Denies nausea, vomiting, poor appetite, diarrhea, heartburn or reflux  Genitourinary: Denies dysuria, frequency, urgency or hematuria  Musculoskeletal: Denies myalgias, muscle weakness, and bone pain  Neurological: Denies dizziness, vertigo, headache, and focal weakness  Psychological: Denies anxiety and depression  Endocrine: Denies heat intolerance and cold intolerance  Hematopoietic/Lymphatic: Denies bleeding problems and blood transfusions    24-hour events:  None    Objective   Vitals: /86   Pulse 97   Temp 98.2 °F (36.8 °C) (Axillary)   Resp 22   Ht 5' 4.5\" (1.638 m)   Wt 165 lb (74.8 kg)   SpO2 93%   BMI 27.88 kg/m²     I/O:    Intake/Output Summary (Last 24 hours) at 10/1/2021 1216  Last data filed at 10/1/2021 0030  Gross per 24 hour Intake --   Output 400 ml   Net -400 ml       Vent Information  FiO2 : 50 %  SpO2: 93 %  SpO2/FiO2 ratio: 186      CURRENT MEDS :  Scheduled Meds:   [START ON 10/2/2021] enoxaparin  40 mg SubCUTAneous Daily    dexamethasone  10 mg IntraVENous Q12H    albuterol sulfate HFA  2 puff Inhalation 4x daily    pantoprazole  40 mg Oral QAM AC    sodium chloride flush  5-40 mL IntraVENous 2 times per day    Vitamin D  2,000 Units Oral Daily    zinc sulfate  50 mg Oral Daily    ascorbic acid  500 mg Oral BID    baricitinib  4 mg Oral Daily       Physical Exam:  General: The patient is lying in bed comfortably without any distress. Breathing is not labored  HEENT: Pupils are equal round and reactive to light, there are no oral lesions and no post-nasal drip   Neck: supple without adenopathy  Cardiovascular: regular rate and rhythm without murmur or gallop  Respiratory: diminished and clear  to auscultation bilaterally without wheezing or crackles. Air entry is symmetric  Abdomen: soft, non-tender, non-distended, normal bowel sounds  Extremities: warm, no edema, no clubbing  Skin: no rash or lesion  Neurologic: CN II-XII grossly intact, no focal deficits     Pertinent/ New Labs and Imaging Studies     Imaging Personally Reviewed:  CXR 9/30  The heart is mildly enlarged.  Patchy increased opacity is present in the mid   and lower lungs.  Some clearing has occurred.  No abnormal vascular   congestion or pleural effusion.  Lung volumes are low.         ECHO none on file      Labs:  Lab Results   Component Value Date    WBC 5.6 09/28/2021    HGB 12.5 09/28/2021    HCT 38.1 09/28/2021    MCV 89.4 09/28/2021    MCH 29.3 09/28/2021    MCHC 32.8 09/28/2021    RDW 12.9 09/28/2021     09/28/2021    MPV 10.2 09/28/2021     Lab Results   Component Value Date     09/28/2021    K 4.6 09/28/2021     09/28/2021    CO2 25 09/28/2021    BUN 19 09/28/2021    CREATININE 0.9 09/28/2021    LABALBU 2.9 09/28/2021 LABALBU 4.3 12/16/2010    CALCIUM 9.2 09/28/2021    GFRAA >60 09/28/2021    LABGLOM >60 09/28/2021     No results found for: PROTIME, INR  No results for input(s): PROBNP in the last 72 hours. Recent Labs     10/01/21  0230   PROCAL 0.07     This SmartLink has not been configured with any valid records. Micro:  9/28 respiratory panel positive for Covid-19   No results for input(s): CULTRESP in the last 72 hours. No results for input(s): LABGRAM in the last 72 hours. No results for input(s): LEGUR in the last 72 hours. Recent Labs     09/28/21  1500   STREPNEUMAGU Presumptive negative- suggests no current or recent  pneumococcal infection. Infection due to Strep pneumoniae cannot be  ruled out since the antigen present in the sample  may be below the detection limit of the test.  Normal Range:Presumptive Negative       Recent Labs     09/28/21  1500   LP1UAG Presumptive Negative -suggesting no recent or current infections  with Legionella pneumophila serogroup 1. Infection to Legionella cannot be ruled out since other serogroups  and species may cause infection, antigen may not be present in  early infection, or level of antigen may be below the  detection limit.   Normal Range: Presumptive Negative       Original date of  Testing: around 9/17/21 as OP.  9/28 Covid positive on respiratory panel  Vaccination status: not vaccinated  Current oxygen delivery system: NRB   Dexamethasone dosing:  10 mg IV BID start date: 9/27  Remdesivir Y/N: none       Baricitinib Y/N:  4 mg po daily  start date: 9/27/21  Tocilizumab Y/N: none    Respiratory cultures Y/N: none       Strep pneumoniae and Legionella Urine Antigen test Y/N: results: Negative  Antibiotics: Levaquin 750 mg daily started 9/28, got 1 dose   CRP: 17.7-> 4.6>1.3  D Dimer: 1940>1797-> 945>603  Procalcitonin: 0.18-> .08   Sed rate: 94-> 100  DVT prophylaxis: lovenox 40 mg SQ bid  GI prophylaxis:  protonix  Lung recruitment techniques: incentive spirometer     Assessment:    1. Acute respiratory failure with hypoxia  2. SARS CoV 2 pneumonia -mod to severe  3. Leukopenia, lymphopenia related to viral infection  4. Elevated transaminases    Plan:   5. Oxygen therapy wean 35% venti-mask wean O2 to keep >88%. Patient doesn't tolerate NC  6. Will need ambulatory oxygen testing. Once oxygen requirements are determined patient is likely able to discharge to home. We can follow up with repeat oxygen testing 2 wks and  cxr 4-6 wks in our office  7. Treatment for COVID: dexamethasone, baricitinib, vitamins  8. Incentive spirometer, lung recruitment techniques  9. Follow cxr imaging, improved today  10. Albuterol QID  11. Increase activity as tolerated  12. Trend inflammatory markers- all improving. D dimer trending down. 13. GI prophylaxis- Protonix  14. Spoke to wife Gabby phone update. She is requesting an oxygen chamber and a more holistic approach to treat his moderate to severe covid pneumonia. Also spoke with patient advocate. This plan of care was reviewed in collaboration with Dr. Crystal Rajan  Electronically signed by JESSICA Aguilar CNP on 10/1/2021 at 12:16 PM      I personally saw, examined, and cared for the patient. Labs, medications, radiographs reviewed. I agree with history exam and plans detailed in NP note. Patient has clinically improved. Called the wife on the phone and updated her at length. Discussed that patient is improving and that I am hopeful that he will continue to improve. She inquired about alternative treatments. Discussed that while there may be some merit to these therapies we will continue to practice standard of care treatment approach at this time.       Edu Gary,

## 2021-10-02 LAB
BLOOD CULTURE, ROUTINE: NORMAL
CULTURE, BLOOD 2: NORMAL
LACTATE DEHYDROGENASE: 450 U/L (ref 135–225)
SEDIMENTATION RATE, ERYTHROCYTE: 64 MM/HR (ref 0–15)

## 2021-10-02 PROCEDURE — 99233 SBSQ HOSP IP/OBS HIGH 50: CPT | Performed by: INTERNAL MEDICINE

## 2021-10-02 PROCEDURE — 83615 LACTATE (LD) (LDH) ENZYME: CPT

## 2021-10-02 PROCEDURE — 36415 COLL VENOUS BLD VENIPUNCTURE: CPT

## 2021-10-02 PROCEDURE — 6370000000 HC RX 637 (ALT 250 FOR IP): Performed by: NURSE PRACTITIONER

## 2021-10-02 PROCEDURE — 2060000000 HC ICU INTERMEDIATE R&B

## 2021-10-02 PROCEDURE — 85651 RBC SED RATE NONAUTOMATED: CPT

## 2021-10-02 PROCEDURE — 6360000002 HC RX W HCPCS: Performed by: NURSE PRACTITIONER

## 2021-10-02 PROCEDURE — 6360000002 HC RX W HCPCS: Performed by: INTERNAL MEDICINE

## 2021-10-02 PROCEDURE — 2580000003 HC RX 258: Performed by: NURSE PRACTITIONER

## 2021-10-02 RX ADMIN — Medication 2000 UNITS: at 11:30

## 2021-10-02 RX ADMIN — DEXAMETHASONE SODIUM PHOSPHATE 10 MG: 4 INJECTION, SOLUTION INTRAMUSCULAR; INTRAVENOUS at 14:00

## 2021-10-02 RX ADMIN — Medication 10 ML: at 09:14

## 2021-10-02 RX ADMIN — BARICITINIB 4 MG: 2 TABLET, FILM COATED ORAL at 11:29

## 2021-10-02 RX ADMIN — ALBUTEROL SULFATE 2 PUFF: 90 AEROSOL, METERED RESPIRATORY (INHALATION) at 11:30

## 2021-10-02 RX ADMIN — ALBUTEROL SULFATE 2 PUFF: 90 AEROSOL, METERED RESPIRATORY (INHALATION) at 09:14

## 2021-10-02 RX ADMIN — SODIUM CHLORIDE, PRESERVATIVE FREE 10 ML: 5 INJECTION INTRAVENOUS at 13:44

## 2021-10-02 RX ADMIN — PANTOPRAZOLE SODIUM 40 MG: 40 TABLET, DELAYED RELEASE ORAL at 09:14

## 2021-10-02 RX ADMIN — ZINC SULFATE 220 MG (50 MG) CAPSULE 50 MG: CAPSULE at 09:14

## 2021-10-02 RX ADMIN — Medication 10 ML: at 22:28

## 2021-10-02 RX ADMIN — DEXAMETHASONE SODIUM PHOSPHATE 10 MG: 4 INJECTION, SOLUTION INTRAMUSCULAR; INTRAVENOUS at 03:47

## 2021-10-02 RX ADMIN — Medication 500 MG: at 22:28

## 2021-10-02 RX ADMIN — ALBUTEROL SULFATE 2 PUFF: 90 AEROSOL, METERED RESPIRATORY (INHALATION) at 22:28

## 2021-10-02 RX ADMIN — ENOXAPARIN SODIUM 40 MG: 40 INJECTION SUBCUTANEOUS at 09:14

## 2021-10-02 RX ADMIN — Medication 500 MG: at 09:14

## 2021-10-02 RX ADMIN — ALBUTEROL SULFATE 2 PUFF: 90 AEROSOL, METERED RESPIRATORY (INHALATION) at 16:25

## 2021-10-02 ASSESSMENT — PAIN SCALES - GENERAL
PAINLEVEL_OUTOF10: 0

## 2021-10-02 NOTE — PROGRESS NOTES
[facility] Progress Note    Admitting Date and Time: 9/27/2021 10:53 AM  Admit Dx: Acute respiratory failure with hypoxia (HCC) [J96.01]  COVID-19 [U07.1]  Acute respiratory failure due to COVID-19 (HCC) [U07.1, J96.00]    Subjective:  Patient is being followed for Acute respiratory failure with hypoxia ([address] [J96.01]  COVID-19 [U07.1]  Acute respiratory failure due to COVID-19 ([address] [U07.1, J96.00]   Pt feels okay, doing well on 4-5L, less short of breath, no cough. We attempted to do ambulatory pulse ox and suddenly patient dropped his sats to 83 even at rest, currently requiring 9 L of oxygen to maintain a saturation above 90. ROS: denies fever, chills, n/v, HA unless stated above.       enoxaparin  40 mg SubCUTAneous Daily    dexamethasone  10 mg IntraVENous Q12H    albuterol sulfate HFA  2 puff Inhalation 4x daily    pantoprazole  40 mg Oral QAM AC    sodium chloride flush  5-40 mL IntraVENous 2 times per day    Vitamin D  2,000 Units Oral Daily    zinc sulfate  50 mg Oral Daily    ascorbic acid  500 mg Oral BID    baricitinib  4 mg Oral Daily     sodium chloride, 1 spray, Q2H PRN  sodium chloride flush, 5-40 mL, PRN  sodium chloride, 25 mL, PRN  ondansetron, 4 mg, Q8H PRN   Or  ondansetron, 4 mg, Q6H PRN  polyethylene glycol, 17 g, Daily PRN  acetaminophen, 650 mg, Q6H PRN   Or  acetaminophen, 650 mg, Q6H PRN         Objective:    /75   Pulse 76   Temp 97.2 °F (36.2 °C) (Temporal)   Resp 22   Ht 5' 4.5\" (1.638 m)   Wt 165 lb (74.8 kg)   SpO2 94%   BMI 27.88 kg/m²     General Appearance: alert and oriented to person, place and time   Skin: warm and dry  Head: normocephalic and atraumatic  Eyes: pupils equal, round, and reactive to light, extraocular eye movements intact, conjunctivae normal  Neck: neck supple and non tender without mass   Pulmonary/Chest: Diminished bilaterally- no wheezes, rales or rhonchi  Cardiovascular: normal rate, normal S1 and S2 and no carotid bruits  Abdomen: soft, non-tender, non-distended, normal bowel sounds, no masses or organomegaly  Extremities: no cyanosis, no clubbing and no edema  Neurologic: no cranial nerve deficit and speech normal        No results for input(s): NA, K, CL, CO2, BUN, CREATININE, GLUCOSE, CALCIUM in the last 72 hours. No results for input(s): WBC, RBC, HGB, HCT, MCV, MCH, MCHC, RDW, PLT, MPV in the last 72 hours. Assessment:    Active Problems:    Acute respiratory failure with hypoxia (HCC)  Resolved Problems:    * No resolved hospital problems. *      Plan:  1. Acute hypoxic aspiratory failure due to COVID-19 pneumonia, patient O2 sats was in the 80s on presentation, was requiring 15 L nasal cannula to maintain oxygen saturation above 90%, on 9 L now ox saturation 90%. Treated underlying process and weaning of oxygen. 2.  COVID-19 pneumonia, started patient on Decadron and baricitinib, D-dimer trending down CRP trended down from 17 to 4   3. Prediabetes A1c at 6, diet and lifestyle modification. NOTE: This report was transcribed using voice recognition software. Every effort was made to ensure accuracy; however, inadvertent computerized transcription errors may be present.   Electronically signed by Galina Galvez MD on 10/2/2021 at 9:04 AM

## 2021-10-02 NOTE — PROGRESS NOTES
Patient SPO2 83% on 4L nasal cannula at rest. Oxygen increased to 9L nasal cannula to get SPO2 90% at rest. Patient ambulated on 9L nasal cannula and SPO2 remained 88%. Patient returned to bedside on 9L nasal cannula, SPO2 recovered to 89%.

## 2021-10-02 NOTE — PLAN OF CARE
Problem: Falls - Risk of:  Goal: Will remain free from falls  Description: Will remain free from falls  Outcome: Met This Shift  Goal: Absence of physical injury  Description: Absence of physical injury  Outcome: Met This Shift     Problem: Pain:  Goal: Pain level will decrease  Description: Pain level will decrease  Outcome: Met This Shift  Goal: Control of acute pain  Description: Control of acute pain  Outcome: Met This Shift  Goal: Control of chronic pain  Description: Control of chronic pain  Outcome: Met This Shift     Problem: Isolation Precautions - Risk of Spread of Infection  Goal: Isolation precautions  Description: Isolation precautions  Outcome: Met This Shift  Goal: Prevent transmission of infection  Outcome: Met This Shift     Problem: Airway Clearance - Ineffective  Goal: Achieve or maintain patent airway  Outcome: Ongoing     Problem: Gas Exchange - Impaired  Goal: Absence of hypoxia  Outcome: Ongoing  Goal: Promote optimal lung function  Outcome: Ongoing     Problem: Breathing Pattern - Ineffective  Goal: Ability to achieve and maintain a regular respiratory rate  Outcome: Ongoing     Problem:  Body Temperature -  Risk of, Imbalanced  Goal: Ability to maintain a body temperature within defined limits  Outcome: Met This Shift  Goal: Will regain or maintain usual level of consciousness  Outcome: Met This Shift  Goal: Complications related to the disease process, condition or treatment will be avoided or minimized  Outcome: Met This Shift     Problem: Nutrition Deficits  Goal: Optimize nutritional status  Outcome: Met This Shift     Problem: Risk for Fluid Volume Deficit  Goal: Maintain normal heart rhythm  Outcome: Met This Shift  Goal: Maintain absence of muscle cramping  Outcome: Met This Shift  Goal: Maintain normal serum potassium, sodium, calcium, phosphorus, and pH  Outcome: Met This Shift     Problem: Loneliness or Risk for Loneliness  Goal: Demonstrate positive use of time alone when socialization is not possible  Outcome: Met This Shift     Problem: Patient Education: Go to Patient Education Activity  Goal: Patient/Family Education  Outcome: Met This Shift     Problem: Fatigue  Goal: Verbalize increase energy and improved vitality  Outcome: Met This Shift

## 2021-10-02 NOTE — PROGRESS NOTES
Pt educated on importance of prone positioning and incentive spirometer use- pt verbalized [id number] hour chart check completed.

## 2021-10-03 ENCOUNTER — APPOINTMENT (OUTPATIENT)
Dept: GENERAL RADIOLOGY | Age: 51
DRG: 177 | End: 2021-10-03
Payer: COMMERCIAL

## 2021-10-03 LAB
C-REACTIVE PROTEIN: 0.7 MG/DL (ref 0–0.4)
LACTATE DEHYDROGENASE: 469 U/L (ref 135–225)
PROCALCITONIN: 0.1 NG/ML (ref 0–0.08)

## 2021-10-03 PROCEDURE — 6360000002 HC RX W HCPCS: Performed by: INTERNAL MEDICINE

## 2021-10-03 PROCEDURE — 71045 X-RAY EXAM CHEST 1 VIEW: CPT

## 2021-10-03 PROCEDURE — 83615 LACTATE (LD) (LDH) ENZYME: CPT

## 2021-10-03 PROCEDURE — 2580000003 HC RX 258: Performed by: NURSE PRACTITIONER

## 2021-10-03 PROCEDURE — 2700000000 HC OXYGEN THERAPY PER DAY

## 2021-10-03 PROCEDURE — 6360000002 HC RX W HCPCS: Performed by: NURSE PRACTITIONER

## 2021-10-03 PROCEDURE — 6370000000 HC RX 637 (ALT 250 FOR IP): Performed by: NURSE PRACTITIONER

## 2021-10-03 PROCEDURE — 84145 PROCALCITONIN (PCT): CPT

## 2021-10-03 PROCEDURE — 2060000000 HC ICU INTERMEDIATE R&B

## 2021-10-03 PROCEDURE — 36415 COLL VENOUS BLD VENIPUNCTURE: CPT

## 2021-10-03 PROCEDURE — 99233 SBSQ HOSP IP/OBS HIGH 50: CPT | Performed by: INTERNAL MEDICINE

## 2021-10-03 PROCEDURE — 86140 C-REACTIVE PROTEIN: CPT

## 2021-10-03 RX ADMIN — Medication 10 ML: at 09:58

## 2021-10-03 RX ADMIN — DEXAMETHASONE SODIUM PHOSPHATE 10 MG: 4 INJECTION, SOLUTION INTRAMUSCULAR; INTRAVENOUS at 02:20

## 2021-10-03 RX ADMIN — Medication 10 ML: at 20:54

## 2021-10-03 RX ADMIN — ALBUTEROL SULFATE 2 PUFF: 90 AEROSOL, METERED RESPIRATORY (INHALATION) at 16:50

## 2021-10-03 RX ADMIN — ALBUTEROL SULFATE 2 PUFF: 90 AEROSOL, METERED RESPIRATORY (INHALATION) at 20:53

## 2021-10-03 RX ADMIN — ALBUTEROL SULFATE 2 PUFF: 90 AEROSOL, METERED RESPIRATORY (INHALATION) at 09:58

## 2021-10-03 RX ADMIN — ENOXAPARIN SODIUM 40 MG: 40 INJECTION SUBCUTANEOUS at 09:57

## 2021-10-03 RX ADMIN — ZINC SULFATE 220 MG (50 MG) CAPSULE 50 MG: CAPSULE at 09:55

## 2021-10-03 RX ADMIN — BARICITINIB 4 MG: 2 TABLET, FILM COATED ORAL at 09:55

## 2021-10-03 RX ADMIN — ALBUTEROL SULFATE 2 PUFF: 90 AEROSOL, METERED RESPIRATORY (INHALATION) at 11:39

## 2021-10-03 RX ADMIN — Medication 500 MG: at 09:55

## 2021-10-03 RX ADMIN — PANTOPRAZOLE SODIUM 40 MG: 40 TABLET, DELAYED RELEASE ORAL at 05:08

## 2021-10-03 RX ADMIN — SODIUM CHLORIDE, PRESERVATIVE FREE 10 ML: 5 INJECTION INTRAVENOUS at 13:38

## 2021-10-03 RX ADMIN — DEXAMETHASONE SODIUM PHOSPHATE 10 MG: 4 INJECTION, SOLUTION INTRAMUSCULAR; INTRAVENOUS at 13:37

## 2021-10-03 RX ADMIN — Medication 500 MG: at 20:53

## 2021-10-03 RX ADMIN — Medication 2000 UNITS: at 09:55

## 2021-10-03 ASSESSMENT — PAIN SCALES - GENERAL
PAINLEVEL_OUTOF10: 0

## 2021-10-03 NOTE — PROGRESS NOTES
[facility] Progress Note    Admitting Date and Time: 9/27/2021 10:53 AM  Admit Dx: Acute respiratory failure with hypoxia (HCC) [J96.01]  COVID-19 [U07.1]  Acute respiratory failure due to COVID-19 (HCC) [U07.1, J96.00]    Subjective:  Patient is being followed for Acute respiratory failure with hypoxia ([address] [J96.01]  COVID-19 [U07.1]  Acute respiratory failure due to COVID-19 ([address] [U07.1, J96.00]   Pt feels okay, doing well on 4-5L, less short of breath, no cough. We attempted to do ambulatory pulse ox and suddenly patient dropped his sats to 83 even at rest, currently requiring 9 L of oxygen to maintain a saturation above 90. ROS: denies fever, chills, n/v, HA unless stated above.       enoxaparin  40 mg SubCUTAneous Daily    dexamethasone  10 mg IntraVENous Q12H    albuterol sulfate HFA  2 puff Inhalation 4x daily    pantoprazole  40 mg Oral QAM AC    sodium chloride flush  5-40 mL IntraVENous 2 times per day    Vitamin D  2,000 Units Oral Daily    zinc sulfate  50 mg Oral Daily    ascorbic acid  500 mg Oral BID    baricitinib  4 mg Oral Daily     sodium chloride, 1 spray, Q2H PRN  sodium chloride flush, 5-40 mL, PRN  sodium chloride, 25 mL, PRN  ondansetron, 4 mg, Q8H PRN   Or  ondansetron, 4 mg, Q6H PRN  polyethylene glycol, 17 g, Daily PRN  acetaminophen, 650 mg, Q6H PRN   Or  acetaminophen, 650 mg, Q6H PRN         Objective:    /76   Pulse 75   Temp 98 °F (36.7 °C) (Oral)   Resp 18   Ht 5' 4.5\" (1.638 m)   Wt 165 lb (74.8 kg)   SpO2 95%   BMI 27.88 kg/m²     General Appearance: alert and oriented to person, place and time   Skin: warm and dry  Head: normocephalic and atraumatic  Eyes: pupils equal, round, and reactive to light, extraocular eye movements intact, conjunctivae normal  Neck: neck supple and non tender without mass   Pulmonary/Chest: Diminished bilaterally- no wheezes, rales or rhonchi  Cardiovascular: normal rate, normal S1 and S2 and no carotid bruits  Abdomen: soft, non-tender, non-distended, normal bowel sounds, no masses or organomegaly  Extremities: no cyanosis, no clubbing and no edema  Neurologic: no cranial nerve deficit and speech normal        No results for input(s): NA, K, CL, CO2, BUN, CREATININE, GLUCOSE, CALCIUM in the last 72 hours. No results for input(s): WBC, RBC, HGB, HCT, MCV, MCH, MCHC, RDW, PLT, MPV in the last 72 hours. Assessment:    Active Problems:    Acute respiratory failure with hypoxia (HCC)  Resolved Problems:    * No resolved hospital problems. *      Plan:  1. Acute hypoxic aspiratory failure due to COVID-19 pneumonia, patient O2 sats was in the 80s on presentation, was requiring 15 L nasal cannula to maintain oxygen saturation above 90%, on 4-5 L now ox saturation 90%. Treated underlying process and weaning of oxygen. 2.  COVID-19 pneumonia, started patient on Decadron and baricitinib, D-dimer trending down CRP trended down from 17 to 4   3. Prediabetes A1c at 6, diet and lifestyle modification. Patient continues to desaturate whenever we ambulate him on 5 to 6 L, apparently the patient would need slow time weaning, I do not think he would be able to do that at home, might need rehab or LTAC. NOTE: This report was transcribed using voice recognition software. Every effort was made to ensure accuracy; however, inadvertent computerized transcription errors may be present.   Electronically signed by Maureen Kebede MD on 10/3/2021 at 9:34 AM

## 2021-10-03 NOTE — PROGRESS NOTES
Patient SPO2 at rest on room air is 86%. Oxygen appliead at 5L via nasal cannula, SPO2 at rest 91%. Patient ambulated on 5L via nasal cannula, SPO2 dropped to 84%. Oxygen increased to 6L via nasal cannula, SPO2 remained 84%. Patient returned to bedside on 6L via nasal cannula, recovery SPO2 92% on 6L via nasal cannula. Patient reports he cannot tell that his oxygen is dropping, he does not feel short of breath.

## 2021-10-03 NOTE — PROGRESS NOTES
Patient is 91% on 6 liters nasal cannula at rest.   Ambulated patient on 6 liters nasal cannula, O2 sat 83%. Oxygen increased to 12 liters nasal cannula while ambulating. Recovery pulse ox was 88% on 12 liters nasal cannula while ambulating.

## 2021-10-04 VITALS
BODY MASS INDEX: 27.49 KG/M2 | HEART RATE: 93 BPM | RESPIRATION RATE: 20 BRPM | TEMPERATURE: 97.3 F | DIASTOLIC BLOOD PRESSURE: 79 MMHG | SYSTOLIC BLOOD PRESSURE: 130 MMHG | WEIGHT: 165 LBS | HEIGHT: 65 IN | OXYGEN SATURATION: 96 %

## 2021-10-04 LAB
LACTATE DEHYDROGENASE: 463 U/L (ref 135–225)
SEDIMENTATION RATE, ERYTHROCYTE: 33 MM/HR (ref 0–15)

## 2021-10-04 PROCEDURE — 36415 COLL VENOUS BLD VENIPUNCTURE: CPT

## 2021-10-04 PROCEDURE — 6370000000 HC RX 637 (ALT 250 FOR IP): Performed by: NURSE PRACTITIONER

## 2021-10-04 PROCEDURE — 99239 HOSP IP/OBS DSCHRG MGMT >30: CPT | Performed by: INTERNAL MEDICINE

## 2021-10-04 PROCEDURE — 6360000002 HC RX W HCPCS: Performed by: NURSE PRACTITIONER

## 2021-10-04 PROCEDURE — 83615 LACTATE (LD) (LDH) ENZYME: CPT

## 2021-10-04 PROCEDURE — 85651 RBC SED RATE NONAUTOMATED: CPT

## 2021-10-04 PROCEDURE — 6360000002 HC RX W HCPCS: Performed by: INTERNAL MEDICINE

## 2021-10-04 PROCEDURE — 2580000003 HC RX 258: Performed by: NURSE PRACTITIONER

## 2021-10-04 RX ORDER — ALBUTEROL SULFATE 90 UG/1
2 AEROSOL, METERED RESPIRATORY (INHALATION) 4 TIMES DAILY
Qty: 18 G | Refills: 3 | Status: SHIPPED | OUTPATIENT
Start: 2021-10-04

## 2021-10-04 RX ORDER — DEXAMETHASONE 6 MG/1
6 TABLET ORAL
Qty: 5 TABLET | Refills: 0 | Status: SHIPPED | OUTPATIENT
Start: 2021-10-04 | End: 2021-10-09

## 2021-10-04 RX ADMIN — DEXAMETHASONE SODIUM PHOSPHATE 10 MG: 4 INJECTION, SOLUTION INTRAMUSCULAR; INTRAVENOUS at 15:21

## 2021-10-04 RX ADMIN — ALBUTEROL SULFATE 2 PUFF: 90 AEROSOL, METERED RESPIRATORY (INHALATION) at 12:27

## 2021-10-04 RX ADMIN — Medication 2000 UNITS: at 09:40

## 2021-10-04 RX ADMIN — Medication 500 MG: at 09:40

## 2021-10-04 RX ADMIN — ALBUTEROL SULFATE 2 PUFF: 90 AEROSOL, METERED RESPIRATORY (INHALATION) at 15:21

## 2021-10-04 RX ADMIN — ENOXAPARIN SODIUM 40 MG: 40 INJECTION SUBCUTANEOUS at 09:40

## 2021-10-04 RX ADMIN — ALBUTEROL SULFATE 2 PUFF: 90 AEROSOL, METERED RESPIRATORY (INHALATION) at 09:38

## 2021-10-04 RX ADMIN — DEXAMETHASONE SODIUM PHOSPHATE 10 MG: 4 INJECTION, SOLUTION INTRAMUSCULAR; INTRAVENOUS at 01:17

## 2021-10-04 RX ADMIN — Medication 500 MG: at 19:44

## 2021-10-04 RX ADMIN — ZINC SULFATE 220 MG (50 MG) CAPSULE 50 MG: CAPSULE at 09:40

## 2021-10-04 RX ADMIN — PANTOPRAZOLE SODIUM 40 MG: 40 TABLET, DELAYED RELEASE ORAL at 05:08

## 2021-10-04 RX ADMIN — BARICITINIB 4 MG: 2 TABLET, FILM COATED ORAL at 09:40

## 2021-10-04 RX ADMIN — ALBUTEROL SULFATE 2 PUFF: 90 AEROSOL, METERED RESPIRATORY (INHALATION) at 19:44

## 2021-10-04 RX ADMIN — Medication 10 ML: at 09:40

## 2021-10-04 ASSESSMENT — PAIN SCALES - GENERAL
PAINLEVEL_OUTOF10: 0
PAINLEVEL_OUTOF10: 0

## 2021-10-04 NOTE — PROGRESS NOTES
RT Evaluation for Home Oxygen    Resting (Room Air):  SpO2:  91  HR:  101    Resting (On O2):  SpO2:   95  HR:  97  O2 Device:  hi flow nasal cannula  O2 Flow Rate (L/min):  6    During Walk (Room Air):  SpO2:     HR:    Walk/Assistance Device:    Rate of Dyspnea:    Symptoms:    Comments:      During Walk (On O2):  SpO2:  86  HR:  127  O2 Device:  hi flow nasal cannula  O2 Flow Rate (L/min):  6  O2 Flow Rate:  7  O2 Saturation:  85  O2 Flow Rate:  8  O2 Saturation:  91  Walk/Assistance Device:    Rate of Dyspnea:  32  Symptoms:  mild dyspnea    After Walk:  SpO2:  91  HR:  106  O2 Device:  hi-flow nasal cannula  O2 Flow Rate (L/min):  6  FIO2 (%):    Rate of Dyspnea:  26  Symptoms:  resolving mild dyspnea    Electronically signed by Robbie Melton RN on 10/4/2021 at 5:52 PM

## 2021-10-04 NOTE — PROGRESS NOTES
Nutrition Assessment     Type and Reason for Visit: Initial, RD Nutrition Re-Screen/LOS (RD Re-Screen Negative)    Nutrition Assessment:  Pt assessed per LOS protocol. Chart reviewed. Pt currently eating ~75% of most meals and w/ no other significant nutritional issues noted at this time. Will follow-up per policy. Please consult if RD needed.     Electronically signed by Leigh Palmer RD, FERNANDA on 10/4/21 at 9:46 AM EDT    Contact: ext [phone]

## 2021-10-04 NOTE — PROGRESS NOTES
Matthias Diallo M.D.,[facility]  Johnson Martinez D.O., F.A.C.O.I., Marlyn Box M.D. Rosi Hernandez M.D. Janiya Mock D.O. Daily Pulmonary Progress Note    Patient:  Margaree Bence 45 y.o. male MRN: [id number]     Date of Service: 10/4/2021      Synopsis     We are following patient for hypoxia, COVID +    \"CC\" shortness of breath    Code status: Full      Subjective      Patient was seen and examined. Patient on 6L NC without distress. Will need ambulatory pulse ox testing prior to discharge. Patient states he feels good. Patient is looking much improved today. 0959: Pt ambulated to bathroom and saturation dropped to 80% on 6L, took patient two minutes to recover to 93% on 6L . Review of Systems:  Constitutional: Denies fever, weight loss, night sweats, and fatigue  Skin: Denies pigmentation, dark lesions, and rashes   HEENT: Denies hearing loss, tinnitus, ear drainage, epistaxis, sore throat, and hoarseness. Cardiovascular: Denies palpitations, chest pain, and chest pressure. Respiratory: Denies dyspnea at rest, hemoptysis, apnea, and choking.   CORREA-improved  Gastrointestinal: Denies nausea, vomiting, poor appetite, diarrhea, heartburn or reflux  Genitourinary: Denies dysuria, frequency, urgency or hematuria  Musculoskeletal: Denies myalgias, muscle weakness, and bone pain  Neurological: Denies dizziness, vertigo, headache, and focal weakness  Psychological: Denies anxiety and depression  Endocrine: Denies heat intolerance and cold intolerance  Hematopoietic/Lymphatic: Denies bleeding problems and blood transfusions    24-hour events:  None    Objective   Vitals: /81   Pulse 110   Temp 97.7 °F (36.5 °C) (Oral)   Resp 20   Ht 5' 4.5\" (1.638 m)   Wt 165 lb (74.8 kg)   SpO2 94%   BMI 27.88 kg/m²     I/O:    Intake/Output Summary (Last 24 hours) at 10/4/2021 1712  Last data filed at 10/3/2021 2054  Gross per 24 hour   Intake 10 ml   Output --   Net 10 ml       Vent Information  FiO2 : 35 %  SpO2: 94 %  SpO2/FiO2 ratio: 262.86      CURRENT MEDS :  Scheduled Meds:   enoxaparin  40 mg SubCUTAneous Daily    dexamethasone  10 mg IntraVENous Q12H    albuterol sulfate HFA  2 puff Inhalation 4x daily    pantoprazole  40 mg Oral QAM AC    sodium chloride flush  5-40 mL IntraVENous 2 times per day    Vitamin D  2,000 Units Oral Daily    zinc sulfate  50 mg Oral Daily    ascorbic acid  500 mg Oral BID    baricitinib  4 mg Oral Daily       Physical Exam:  General: The patient is lying in bed comfortably without any distress. Breathing is not labored  HEENT: Pupils are equal round and reactive to light, there are no oral lesions and no post-nasal drip   Neck: supple without adenopathy  Cardiovascular: regular rate and rhythm without murmur or gallop  Respiratory: diminished and clear to auscultation bilaterally without wheezing or crackles. Air entry is symmetric  Abdomen: soft, non-tender, non-distended, normal bowel sounds  Extremities: warm, no edema, no clubbing  Skin: no rash or lesion  Neurologic: CN II-XII grossly intact, no focal deficits     Pertinent/ New Labs and Imaging Studies     Imaging Personally Reviewed:  CXR 10/3  Patchy multifocal bilateral pulmonary infiltrates are noted slightly improved   when compared with the prior study of 09/30/2021. There is no pleural effusion.        ECHO none on file      Labs:  Lab Results   Component Value Date    WBC 5.6 09/28/2021    HGB 12.5 09/28/2021    HCT 38.1 09/28/2021    MCV 89.4 09/28/2021    MCH 29.3 09/28/2021    MCHC 32.8 09/28/2021    RDW 12.9 09/28/2021     09/28/2021    MPV 10.2 09/28/2021     Lab Results   Component Value Date     09/28/2021    K 4.6 09/28/2021     09/28/2021    CO2 25 09/28/2021    BUN 19 09/28/2021    CREATININE 0.9 09/28/2021    LABALBU 2.9 09/28/2021    LABALBU 4.3 12/16/2010    CALCIUM 9.2 09/28/2021    GFRAA >60 09/28/2021    LABGLOM >60 09/28/2021     No results found for: PROTIME, INR  No results for input(s): PROBNP in the last 72 hours. Recent Labs     10/03/21  0320   PROCAL 0.10*     This SmartLink has not been configured with any valid records. Micro:  9/28 respiratory panel positive for Covid-19   No results for input(s): CULTRESP in the last 72 hours. No results for input(s): LABGRAM in the last 72 hours. No results for input(s): LEGUR in the last 72 hours. No results for input(s): STREPNEUMAGU in the last 72 hours. No results for input(s): LP1UAG in the last 72 hours. Original date of  Testing: around 9/17/21 as OP.  9/28 Covid positive on respiratory panel  Vaccination status: not vaccinated  Current oxygen delivery system: NRB   Dexamethasone dosing:  10 mg IV BID start date: 9/27  Remdesivir Y/N: none       Baricitinib Y/N:  4 mg po daily  start date: 9/27/21  Tocilizumab Y/N: none    Respiratory cultures Y/N: none       Strep pneumoniae and Legionella Urine Antigen test Y/N: results: Negative  Antibiotics: Levaquin 750 mg daily started 9/28, got 1 dose   CRP: 17.7-> 4.6>1.3-> 0.7   D Dimer: 1940>1797-> 945>603  Procalcitonin: 0.18-> .08-> .10    Sed rate: 94-> 100-> 64-> 33  DVT prophylaxis: lovenox 40 mg SQ bid  GI prophylaxis:  protonix  Lung recruitment techniques: incentive spirometer     Assessment:    Acute respiratory failure with hypoxia  SARS CoV 2 pneumonia -mod to severe  Leukopenia, lymphopenia related to viral infection  Elevated transaminases    Plan:   Wean oxygen a tolerated to keep SpO2 >88%, on 6L NC  Will need ambulatory oxygen testing. Once oxygen requirements are determined patient is likely able to discharge to home. We can follow up with repeat oxygen testing 2 wks and  cxr 4-6 wks in our office.  Message routed to office  Treatment for COVID: dexamethasone, baricitinib, vitamins  Incentive spirometer, lung recruitment techniques  Follow cxr imaging, improving  Albuterol QID  Increase activity as tolerated  Trend inflammatory markers- all improving. D dimer trending down. GI prophylaxis- Protonix  Patient needs to be able to ambulate on 6L NC without dropping below 88%. This plan of care was reviewed in collaboration with Dr. Debby Henson  Electronically signed by JESSICA Sanchez - CNP on 10/4/2021 at 5:12 PM    I personally saw, examined, and cared for the patient. Labs, medications, radiographs reviewed. I agree with history exam and plans detailed in NP note.         Cassandra Dye, DO

## 2021-10-04 NOTE — DISCHARGE SUMMARY
[facility] Physician Discharge Summary       [employer]  [facility]  [address]. [facility]   [phone]           Activity level: As tolerated     Dispo: home      Condition on discharge: Stable     Patient ID:  Jude Samuels  [id number]  25 y.o.  1970    Admit date: 2021    Discharge date and time:  10/4/2021  3:22 PM    Admission Diagnoses: Active Problems:    Acute respiratory failure with hypoxia (HCC)  Resolved Problems:    * No resolved hospital problems. *      Discharge Diagnoses: Active Problems:    Acute respiratory failure with hypoxia (HCC)  Resolved Problems:    * No resolved hospital problems. *      Consults:  IP CONSULT TO PULMONOLOGY  IP CONSULT TO PHARMACY  IP CONSULT TO [address] [place] [address]  Course:   Patient Jude Samuels is a 45 y.o. presented with shortness of breath found to be in Acute respiratory failure with hypoxia ([address] [J96.01]  COVID-19 [U07.1]  Acute respiratory failure due to COVID-19 ([address] [U07.1, J96.00]   We admitted the patient for eval and treated him for the followin. Acute hypoxic aspiratory failure due to COVID-19 pneumonia, patient O2 sats was in the 80s on presentation, was requiring 15 L nasal cannula to maintain oxygen saturation above 90%, on 6 L now ox saturation 90%. we will discharge the patient on 6L O2 at rest and 8 on ambulation, he was stable on discharge  2. COVID-19 pneumonia, started patient on Decadron and baricitinib, D-dimer trending down CRP trended down from 17 to 4, discharge the patient on decadron  3. Prediabetes A1c at 6, diet and lifestyle modification.       Discharge Exam:  General Appearance: alert and oriented to person, place and time   Skin: warm and dry  Head: normocephalic and atraumatic  Eyes: pupils equal, round, and reactive to light, extraocular eye movements intact, conjunctivae normal  Neck: neck supple and non tender without mass   Pulmonary/Chest: Diminished bilaterally- no wheezes, rales or rhonchi  Cardiovascular: normal rate, normal S1 and S2 and no carotid bruits  Abdomen: soft, non-tender, non-distended, normal bowel sounds, no masses or organomegaly  Extremities: no cyanosis, no clubbing and no edema  Neurologic: no cranial nerve deficit and speech normal    I/O last 3 completed shifts: In: 10 [I.V.:10]  Out: -   No intake/output data recorded. LABS:  No results for input(s): NA, K, CL, CO2, BUN, CREATININE, GLUCOSE, CALCIUM in the last 72 hours. No results for input(s): WBC, RBC, HGB, HCT, MCV, MCH, MCHC, RDW, PLT, MPV in the last 72 hours. No results for input(s): POCGLU in the last 72 hours. Imaging:  XR CHEST PORTABLE    Result Date: 9/27/2021  EXAMINATION: ONE XRAY VIEW OF THE CHEST 9/27/2021 11:28 am COMPARISON: 04/11/2018 HISTORY: ORDERING SYSTEM PROVIDED HISTORY: sob TECHNOLOGIST PROVIDED HISTORY: Reason for exam:->sob FINDINGS: The cardiac silhouette is within normals. Multifocal bilateral pneumonia is noted. The pneumonia is most prominent within the lower lobes. There is no pleural effusion.      1. Multifocal bilateral pneumonia       Patient Instructions:      Medication List      START taking these medications    albuterol sulfate  (90 Base) MCG/ACT inhaler  Inhale 2 puffs into the lungs 4 times daily     dexamethasone 6 MG tablet  Commonly known as: DECADRON  Take 1 tablet by mouth daily (with breakfast) for 5 days        CONTINUE taking these medications    EXCEDRIN PM PO           Where to Get Your Medications      These medications were sent to [address] [facility],[address], [address]    Phone: [phone]   · albuterol sulfate  (90 Base) MCG/ACT inhaler  · dexamethasone 6 MG tablet           Note that more than 30 minutes was spent in preparing discharge papers, discussing discharge with patient, medication review, etc.    Signed:  Electronically signed by Merline Face, MD on 10/4/2021 at 3:22 PM

## 2021-10-04 NOTE — PROGRESS NOTES
Pt ambulated to bathroom and saturation dropped to 80% on 6L, took patient two minutes to recover to 93% on 6L .

## 2021-10-04 NOTE — PROGRESS NOTES
[facility] Progress Note    Admitting Date and Time: 9/27/2021 10:53 AM  Admit Dx: Acute respiratory failure with hypoxia (HCC) [J96.01]  COVID-19 [U07.1]  Acute respiratory failure due to COVID-19 (HCC) [U07.1, J96.00]    Subjective:  Patient is being followed for Acute respiratory failure with hypoxia ([address] [J96.01]  COVID-19 [U07.1]  Acute respiratory failure due to COVID-19 ([address] [U07.1, J96.00]   Pt overnight became hypoxic on 7 L, was up to 12 L, an d now weaned down to 6 L.    ROS: denies fever, chills, n/v, HA unless stated above.       enoxaparin  40 mg SubCUTAneous Daily    dexamethasone  10 mg IntraVENous Q12H    albuterol sulfate HFA  2 puff Inhalation 4x daily    pantoprazole  40 mg Oral QAM AC    sodium chloride flush  5-40 mL IntraVENous 2 times per day    Vitamin D  2,000 Units Oral Daily    zinc sulfate  50 mg Oral Daily    ascorbic acid  500 mg Oral BID    baricitinib  4 mg Oral Daily     sodium chloride, 1 spray, Q2H PRN  sodium chloride flush, 5-40 mL, PRN  sodium chloride, 25 mL, PRN  ondansetron, 4 mg, Q8H PRN   Or  ondansetron, 4 mg, Q6H PRN  polyethylene glycol, 17 g, Daily PRN  acetaminophen, 650 mg, Q6H PRN   Or  acetaminophen, 650 mg, Q6H PRN         Objective:    /73   Pulse 102   Temp 97.9 °F (36.6 °C) (Oral)   Resp 20   Ht 5' 4.5\" (1.638 m)   Wt 165 lb (74.8 kg)   SpO2 91%   BMI 27.88 kg/m²     General Appearance: alert and oriented to person, place and time   Skin: warm and dry  Head: normocephalic and atraumatic  Eyes: pupils equal, round, and reactive to light, extraocular eye movements intact, conjunctivae normal  Neck: neck supple and non tender without mass   Pulmonary/Chest: Diminished bilaterally- no wheezes, rales or rhonchi  Cardiovascular: normal rate, normal S1 and S2 and no carotid bruits  Abdomen: soft, non-tender, non-distended, normal bowel sounds, no masses or organomegaly  Extremities: no cyanosis, no clubbing and no edema  Neurologic: no cranial nerve deficit and speech normal        No results for input(s): NA, K, CL, CO2, BUN, CREATININE, GLUCOSE, CALCIUM in the last 72 hours. No results for input(s): WBC, RBC, HGB, HCT, MCV, MCH, MCHC, RDW, PLT, MPV in the last 72 hours. Assessment:    Active Problems:    Acute respiratory failure with hypoxia (HCC)  Resolved Problems:    * No resolved hospital problems. *      Plan:  1. Acute hypoxic aspiratory failure due to COVID-19 pneumonia, patient O2 sats was in the 80s on presentation, was requiring 15 L nasal cannula to maintain oxygen saturation above 90%, on 6 L now ox saturation 90%. Treated underlying process and weaning of oxygen. 2.  COVID-19 pneumonia, started patient on Decadron and baricitinib, D-dimer trending down CRP trended down from 17 to 4   3. Prediabetes A1c at 6, diet and lifestyle modification. Patient prefers to go home, even if on 6-7L of O2, yet he is open to placement if needed, will discuss with SW/CM his options. NOTE: This report was transcribed using voice recognition software. Every effort was made to ensure accuracy; however, inadvertent computerized transcription errors may be present.   Electronically signed by Gem Patel MD on 10/4/2021 at 9:55 AM

## 2021-10-04 NOTE — PROGRESS NOTES
Dr. Dilcia Key notified of oxygen requirements for pt on ambulation 8 liters hi-flow. New orders received to increase home oxygen orders to 8 liters and discharge pt as ordered.

## 2021-10-05 ENCOUNTER — CARE COORDINATION (OUTPATIENT)
Dept: CASE MANAGEMENT | Age: 51
End: 2021-10-05

## 2021-10-05 NOTE — CARE COORDINATION
Care Transitions Outreach Attempt COVID    Call within 2 business days of discharge: Yes   Attempted to reach patient for transitions of care follow up. Unable to reach patient. Patient: Renetta Cunningham Patient : 1970 MRN: [id number]    Last Discharge [facility]       Complaint Diagnosis Description Type Department Provider    21 Shortness of Breath COVID-19 . .. ED to Hosp-Admission (Discharged) (ADMITTED) T[facility] 4W Maureen Kebede MD; Edyta Villa. ..        1ST ATTEMPT COVID NON REACHED CALL:     Date/Time:  10/5/2021 11:54 AM  This is the 1st Covid Call Attempted to reach patient by telephone. Called within 2 business days of discharge: Yes     Left HIPAA Compliant message on Voice Mail to call CTN (number given) with any questions and an update on patient's condition since discharge. Will attempt to reach patient again. Luh Jarquin, JOEY    [phone]  [facility] Coordinator      Was this an external facility discharge? No Discharge Facility: [facility]    Noted following upcoming appointments from discharge chart review:   [facility] follow up appointment(s): No future appointments.   Non-[facility] follow up appointment(s):

## 2021-10-06 ENCOUNTER — CARE COORDINATION (OUTPATIENT)
Dept: CASE MANAGEMENT | Age: 51
End: 2021-10-06

## 2021-10-06 NOTE — CARE COORDINATION
[address] [facility] Transitions Initial Follow Up Call    Call within 2 business days of discharge: Yes    Patient: Issa Sullivan Patient : 1970   MRN: [id number]  Reason for Admission: covid-19  Discharge Date: 10/4/21 RARS: Readmission Risk Score: 10      Last Discharge [facility]       Complaint Diagnosis Description Type Department Provider    21 Shortness of Breath COVID-19 . .. ED to Hosp-Admission (Discharged) (ADMITTED) TREE AnandW Sandra Rolle MD; Seamus Villa. .. Patient contacted regarding COVID-19 diagnosis. Discussed COVID-19 related testing which was available at this time. Test results were positive. Patient informed of results, if available? Already aware. Care Transition Nurse contacted the patient by telephone to perform post discharge assessment. Call within 2 business days of discharge: Yes. Verified name and  with patient as identifiers. Provided introduction to self, and explanation of the CTN/ACM role, and reason for call due to risk factors for infection and/or exposure to COVID-19. Symptoms reviewed with patient who verbalized the following symptoms: Patient reports he is \"getting there and feeling pretty good. \"  On oxygen currently at 7L NC with oximeter reading 93% at rest, decreases to high 80's% with activity but recovers. Patient's wife Fide Garay also participating in conversation. Reports of occasional itching to chest area but is currently not having. Patient planning to self quarantine until next Tuesday(10/11/21.)      Due to no new or worsening symptoms encounter was not routed to provider for escalation. Discussed follow-up appointments. If no appointment was previously scheduled, appointment scheduling offered: N/A, appointment with PCP this Friday(10/8/21.)  Patient's wife to review AVS on MyChart in regards to follow up pulmonary appointment(Serina)      [facility] follow up appointment(s): No future appointments.   Non-[facility] follow up appointment(s): see above    Non-face-to-face services provided:  Obtained and reviewed discharge summary and/or continuity of care documents     Advance Care Planning:   Does patient have an Advance Directive:  decision maker reviewed and current. Full med review completed, 1111F not entered as non [facility] PCP. CTN provided contact information. Plan for follow-up call in 5-7 days based on severity of symptoms and risk factors. Follow Up  No future appointments.     Bonnie Lopez RN

## 2021-10-13 ENCOUNTER — CARE COORDINATION (OUTPATIENT)
Dept: CASE MANAGEMENT | Age: 51
End: 2021-10-13

## 2021-10-13 RX ORDER — DOXYCYCLINE HYCLATE 100 MG/1
100 CAPSULE ORAL 2 TIMES DAILY
COMMUNITY
Start: 2021-10-13 | End: 2021-10-26 | Stop reason: ALTCHOICE

## 2021-10-13 RX ORDER — TRIAMCINOLONE ACETONIDE 1 MG/G
CREAM TOPICAL 3 TIMES DAILY
COMMUNITY
Start: 2021-10-13

## 2021-10-13 NOTE — CARE COORDINATION
[address] [facility] Transitions Follow Up Call    10/13/2021    Patient: Saintclair Jarvis  Patient : 1970   MRN: [id number]  Reason for Admission: covid-19  Discharge Date: 10/4/21 RARS: Readmission Risk Score: 10        Care Transition Nurse contacted the family by telephone to perform follow-up assessment. CTN spoke with patient's wife Shar Pace who was participant on previous call to patient. Patient has following risk factors of: pneumonia. Symptoms reviewed with family who verbalized the following symptoms:  Shar Pace reports PCP visit completed on 10/8/21 with a follow up occurring today with concurrent CXR which was improved. Patient oxygen down to 6L NC during day and wearing 8L NC at nightly. Oximeter stable per patient's wife. Itchiness of chest worsened with pustules forming-[facility] provider feels it was due to decadron therapy and placed patient on doxycycline and triamcinolone which will be started today. Patient completed with decadron. CTN updated med list and added decadron as allergy to EMR. Shar Pace states CXR to be repeated on 10/25/21 and visit with pulmonary(Serina)is 10/26/21. Due to no new or worsening symptoms encounter was not routed to provider for escalation. CTN provided contact information. Plan for follow-up call in 5-7 days based on severity of symptoms and risk factors.           Follow Up  Future Appointments   Date Time Provider Randy Sellers   10/26/2021  8:30 AM JESSICA Falk - CNP [facility]       Ana Cruz RN

## 2021-10-20 ENCOUNTER — CARE COORDINATION (OUTPATIENT)
Dept: CASE MANAGEMENT | Age: 51
End: 2021-10-20

## 2021-10-20 NOTE — CARE COORDINATION
[address] [facility] Transitions Follow Up Call    10/20/2021    Patient: Jude Samuels  Patient : 1970   MRN: [id number]  Reason for Admission: covid-19  Discharge Date: 10/4/21 RARS: Readmission Risk Score: 10    -First attempt to reach the patient for sub COVID-19 monitoring  Care Transition call post hospital discharge. Message left with CTN's contact information requesting return phone call.         Follow Up  Future Appointments   Date Time Provider Randy Sellers   10/26/2021  8:30 AM JESSICA Fong - CNP [facility]       Gloria Alicia RN

## 2021-10-29 ENCOUNTER — CARE COORDINATION (OUTPATIENT)
Dept: CASE MANAGEMENT | Age: 51
End: 2021-10-29

## 2021-10-29 NOTE — CARE COORDINATION
[address] [facility] Transitions Follow Up Call    10/29/2021    Patient: Litzy Jensen  Patient : 1970   MRN: [id number]  Reason for Admission: covid-19  Discharge Date: 10/4/21 RARS: Readmission Risk Score: 10    -Second  attempt to reach the patient for sub COVID-19 monitoring  Care Transition call post hospital discharge. Message left with CTN's contact information requesting return phone call.  -Noted in EMR patient had visit with pulmonary on 10/26/21.  -CTN to sign off. Follow Up  No future appointments.     Torri Moreno RN